# Patient Record
Sex: FEMALE | Race: WHITE | NOT HISPANIC OR LATINO | Employment: OTHER | URBAN - METROPOLITAN AREA
[De-identification: names, ages, dates, MRNs, and addresses within clinical notes are randomized per-mention and may not be internally consistent; named-entity substitution may affect disease eponyms.]

---

## 2023-11-16 ENCOUNTER — OFFICE VISIT (OUTPATIENT)
Dept: DERMATOLOGY | Facility: CLINIC | Age: 83
End: 2023-11-16
Payer: COMMERCIAL

## 2023-11-16 DIAGNOSIS — Z12.83 SKIN CANCER SCREENING: Primary | ICD-10-CM

## 2023-11-16 DIAGNOSIS — L30.4 INTERTRIGO: ICD-10-CM

## 2023-11-16 DIAGNOSIS — D22.9 NEVUS: ICD-10-CM

## 2023-11-16 DIAGNOSIS — L24.9 IRRITANT CONTACT DERMATITIS, UNSPECIFIED TRIGGER: ICD-10-CM

## 2023-11-16 DIAGNOSIS — L81.4 LENTIGO: ICD-10-CM

## 2023-11-16 DIAGNOSIS — A49.8 PSEUDOMONAS AERUGINOSA INFECTION: ICD-10-CM

## 2023-11-16 DIAGNOSIS — L82.1 SEBORRHEIC KERATOSIS: ICD-10-CM

## 2023-11-16 PROCEDURE — 1125F AMNT PAIN NOTED PAIN PRSNT: CPT | Performed by: NURSE PRACTITIONER

## 2023-11-16 PROCEDURE — 99214 OFFICE O/P EST MOD 30 MIN: CPT | Performed by: NURSE PRACTITIONER

## 2023-11-16 PROCEDURE — 1159F MED LIST DOCD IN RCRD: CPT | Performed by: NURSE PRACTITIONER

## 2023-11-16 RX ORDER — TRIAMCINOLONE ACETONIDE 1 MG/G
CREAM TOPICAL 2 TIMES DAILY PRN
Qty: 28.4 G | Refills: 0 | Status: SHIPPED | OUTPATIENT
Start: 2023-11-16 | End: 2024-01-03 | Stop reason: ALTCHOICE

## 2023-11-16 RX ORDER — CIPROFLOXACIN HYDROCHLORIDE 3 MG/ML
1 SOLUTION/ DROPS OPHTHALMIC 2 TIMES DAILY
Qty: 5 ML | Refills: 1 | Status: SHIPPED | OUTPATIENT
Start: 2023-11-16

## 2023-11-16 RX ORDER — KETOCONAZOLE 20 MG/G
CREAM TOPICAL 2 TIMES DAILY
Qty: 30 G | Refills: 1 | Status: SHIPPED | OUTPATIENT
Start: 2023-11-16 | End: 2024-01-03 | Stop reason: ALTCHOICE

## 2023-11-16 RX ORDER — CIPROFLOXACIN HYDROCHLORIDE 3 MG/ML
1 SOLUTION/ DROPS OPHTHALMIC 2 TIMES DAILY
COMMUNITY
Start: 2022-11-10 | End: 2023-11-16 | Stop reason: SDUPTHER

## 2023-11-16 NOTE — PROGRESS NOTES
Subjective     Heike Muñiz is a 82 y.o. female who presents for the following: Skin Check.     Patient of Ani nolasco last seen for annual skin check and pseudomonal nail infection treated with ciprofloxacin 0.3% drops twice daily to left thumb November 10, 2022 today patient is in for her annual skin exam.      Review of Systems:  No other skin or systemic complaints other than what is documented elsewhere in the note.    The following portions of the chart were reviewed this encounter and updated as appropriate:       Skin Cancer History  No skin cancer on file.    Specialty Problems    None    Past Medical History:  Heike Muñiz  has a past medical history of Personal history of other diseases of the musculoskeletal system and connective tissue (09/03/2015) and Personal history of other endocrine, nutritional and metabolic disease (09/03/2015).    Past Surgical History:  Heike Muñiz  has a past surgical history that includes Colonoscopy (09/03/2015); Tonsillectomy (09/03/2015); and Other surgical history (10/05/2015).    Family History:  Patient family history is not on file.    Social History:  Heike Muñiz  has no history on file for tobacco use, alcohol use, and drug use.    Allergies:  Patient has no known allergies.    Current Medications / CAM's:    Current Outpatient Medications:     ciprofloxacin (Ciloxan) 0.3 % ophthalmic solution, Administer 1 drop into the right eye 2 times a day. Apply to affected left finger nail, Disp: 5 mL, Rfl: 1    ketoconazole (NIZOral) 2 % cream, Apply topically 2 times a day. Under breasts, Disp: 30 g, Rfl: 1    triamcinolone (Kenalog) 0.1 % cream, Apply topically 2 times a day as needed for rash. To left breast from adhesive, Disp: 28.4 g, Rfl: 0     Objective   Well appearing patient in no apparent distress; mood and affect are within normal limits.    A full examination was performed including scalp, head, eyes, ears, nose, lips, neck, chest, axillae, abdomen, back, buttocks,  bilateral upper extremities, bilateral lower extremities, hands, feet, fingers, toes, fingernails, and toenails. All findings within normal limits unless otherwise noted below.    Assessment/Plan   1. Skin cancer screening  82 year old recently diagnosed with lung cancer in the right upper lung presents for FBSE.  She has not seen oncology yet, so she is unsure of the course of treatment at this time.    The patient presented for a routine skin examination today. There are no specific concerns regarding skin health and no new or changing moles, lesions, or rashes.     Assessment: Based on the comprehensive skin examination, there were no concerning or abnormal findings. The patient's skin appeared to be in good health, without any notable dermatologic conditions or lesions.    Plan: Given the absence of any significant skin findings, no specific interventions or treatments are warranted at this time. The patient was educated on the importance of regular skin self-examinations and advised to promptly report any changes or concerns. Routine follow-up for a skin examination was recommended.    -These lesions have benign, reassuring patterns on dermoscopy.  -There were no concerning features found on exam today.  -Recommend continued self-observation, and to contact the office if any changes in nevi are  noticed.    Discussed/information given on safe sun practices and use of sunscreen, sun protective clothing or sun avoidance. Recommend to use OTC medication of sunscreen SPF 30 or higher on a daily basis prior to sun exposure to reduce the risk of skin cancer.    Contact Office if: Any lesions change in size, shape or color; itch, bum or bleed.         2. Intertrigo  Left Inframammary Fold; Right Inframammary Fold    Intertrigo is any infectious or noninfectious inflammatory condition of two closely opposed skin surfaces (intertriginous area). The warm, moist environment of the skin folds is ideal for maintaining this  condition. Constant rubbing (friction) can also worsen the condition.      Risk factors for this condition include: Obesity, clothing (especially tight-fitting clothing) that chafes skin, activities that promote skin-on-skin rubbing, sweating, incontinence, occupational exposure to moisture, such as wearing rubber gloves    Skin care tips:       After antifungal treatment, initiate skin care with drying agents to reduce risk of recurrence. Use of over-the-counter drying agents such as Zeasorb, help reduce moisture.        If incontinent, a structured skin care routine and the use of absorbent hygiene products (adult briefs, underwear liners) can help reduce skin breakdown and reduce the risk of intertrigo.       Wear properly fitting clothes.     PLAN:  Start ketoconazole 2% cream twice daily until clear    Related Medications  ketoconazole (NIZOral) 2 % cream  Apply topically 2 times a day. Under breasts    3. Irritant contact dermatitis, unspecified trigger  Left Breast  Patient recently had a biopsy 3 days ago for diagnosis of lung cancer with a contact allergen related to adhesive from the bandage at the biopsy site.  Patient states it is itchy and was previously blistering.  There is no bandage on exam today patient encouraged to discontinue use.  Biopsy site appears to be healing well without evidence of infection    Can use triamcinolone 0.1% cream twice daily for up to 2 weeks or until itching and redness resolved    triamcinolone (Kenalog) 0.1 % cream - Left Breast  Apply topically 2 times a day as needed for rash. To left breast from adhesive    4. Pseudomonas aeruginosa infection  Left Thumb Nail Plate  Green to black discoloration to left thumbnail in a patient that regularly gets manicures.  Patient had a similar problem last year that was resolved with ciprofloxacin eyedrops applied for 1 to 2 months.  Patient states that she noticed it returned several weeks ago    We discussed the relationship with  "Pseudomonas of the nail and manicures  Patient states she understands  Ciprofloxacin 0.3% ophthalmic solution prescribed but patient is aware she should not apply to her eyes but apply twice daily to affected nail until clear  She was also encouraged to do white vinegar soaks for 10 minutes 3 times a day.  Patient admits she likely won't be able to given her new lung cancer diagnosis    Related Medications  ciprofloxacin (Ciloxan) 0.3 % ophthalmic solution  Administer 1 drop into the right eye 2 times a day. Apply to affected left finger nail    5. Nevus  Multiple benign appearing flesh colored to pigmented macules and papules     Plan: Counseling.  I counseled the patient regarding the following:  Instructions: Monthly self-skin checks to monitor for any changes in moles are recommended. Expectations: Benign Nevi are pigmented nests of cells within the skin.No treatment is necessary. Contact Office if: Any moles change in size, shape or color; itch, bum or bleed.    6. Lentigo  Benign pigmented macules appearing in sun exposed areas     Solar lentigo (a type of lentigo also known as a senile lentigo, age spot, or liver spot) is a benign pigmented macule appearing on fair-skinned individuals that is related to ultraviolet radiation (UVR) exposure, typically from the sun.     PLAN:  Limiting sun exposure through avoidance, protective clothing, and use of sunscreens can help prevent the appearance of solar lentigines.    If lesion changes or becomes symptomatic she should return to clinic    7. Seborrheic keratosis    Seborrheic keratoses (SKs) are extremely common benign neoplasms of the skin. There can be few or hundreds of these raised, \"stuck-on\"-appearing papules and plaques with well-defined borders. The cause is unknown, although there is a familial trait for the development of multiple SKs.      SKs tend to increase in incidence and number with increasing age.     Skin Care: Seborrheic Keratoses are benign. No " treatment is necessary.    Patient was instructed to call the office if any lesions become irritated or inflamed

## 2023-12-04 NOTE — PROGRESS NOTES
Subjective   Heike Muñiz  is a 83 y.o. female who presents for evaluation of left lung cancer.      This started with a blood clot, which led to a scan and an incidental nodule. She then received a CT scan of the chest on 10/5/2023 which showed a 1.9 x 2.3 cm mass in the left upper lobe.  This included a solid component measuring 8 mm in diameter.  The solid component measuring 5 mm on the previous CT scan.  She was referred for a CT-guided lung biopsy on 10/31/2023, which showed lung cancer.  She also underwent a PET/CT on 11/13/2023, which showed no evidence of metastatic disease.    Currently the patient is in their usual state of health. They deny the following symptoms: chest pain, shortness of breath at rest, shortness of breath with activity, cough, hemoptysis, fevers, chills, weight loss, abdominal pain, changes to bowel function, and changes to mental status.      There have been no significant changes to their documented medical, surgical and family history.   Medical history is notable for no history of MI, CVA or other major cardiovascular disease. He has no history of prior chest surgery.  She had a meningioma on her brain left side s/p surgery ~2019    She  reports that she has quit smoking. Her smoking use included cigarettes. She has never used smokeless tobacco. She reports current alcohol use. She reports that she does not use drugs.  She quit 20 year ago after smoking 1ppd x 40 years    Objective   Physical Exam  The patient is well-appearing and in no acute distress. The trachea is midline and there is no crepitus. The lungs were clear to auscultation grossly. There was good effort and excursion. The heart had a regular rate and rhythm. The abdomen was soft, nontender and nondistended. The extremities had no edema or gross deformities. Mood and affect are appropriate.  Diagnostic Studies  I have reviewed a CT which shows a 9mm solid nodule with a 2.9cm ground glass  I have reviewed a PET which  shows no metastasis  I have reviewed a biopsy which shows TTF1 positive lung cancer    Assessment/Plan   Overall, I believe that the patient  has a new diagnosis of clinical stage 1 lung cancer .     She appears to be a reasonable surgical candidate. We will get PFTs to check breathing capacity.  Given the small peripheral nature of the tumor, Ithink that a wedge resection is reasonable, if the wedge resection frozen section is positive, she may require a lobectomy. I described the risks, benefits and alteratives to this with the patient in detail. She expressed understanding and agreed to proceed.     I recommend  PFTS and surgery. Left VATS/RATS wedge, poss lobe at [place]. Patient prefers surgery in Jan 2024 .     I discussed this in detail with the patient, including a discussion of alternatives. They were comfortable with this approach.     Enrique Sorenson MD  [phone]

## 2023-12-06 ENCOUNTER — OFFICE VISIT (OUTPATIENT)
Dept: SURGERY | Facility: CLINIC | Age: 83
End: 2023-12-06
Payer: COMMERCIAL

## 2023-12-06 VITALS
SYSTOLIC BLOOD PRESSURE: 143 MMHG | WEIGHT: 174.6 LBS | RESPIRATION RATE: 20 BRPM | HEIGHT: 63 IN | BODY MASS INDEX: 30.94 KG/M2 | HEART RATE: 71 BPM | OXYGEN SATURATION: 96 % | DIASTOLIC BLOOD PRESSURE: 69 MMHG | TEMPERATURE: 98.2 F

## 2023-12-06 DIAGNOSIS — C34.12 PRIMARY CANCER OF LEFT UPPER LOBE OF LUNG (MULTI): Primary | ICD-10-CM

## 2023-12-06 PROCEDURE — 1159F MED LIST DOCD IN RCRD: CPT | Performed by: THORACIC SURGERY (CARDIOTHORACIC VASCULAR SURGERY)

## 2023-12-06 PROCEDURE — 99215 OFFICE O/P EST HI 40 MIN: CPT | Mod: 57 | Performed by: THORACIC SURGERY (CARDIOTHORACIC VASCULAR SURGERY)

## 2023-12-06 PROCEDURE — 1036F TOBACCO NON-USER: CPT | Performed by: THORACIC SURGERY (CARDIOTHORACIC VASCULAR SURGERY)

## 2023-12-06 PROCEDURE — 99205 OFFICE O/P NEW HI 60 MIN: CPT | Performed by: THORACIC SURGERY (CARDIOTHORACIC VASCULAR SURGERY)

## 2023-12-06 PROCEDURE — 1126F AMNT PAIN NOTED NONE PRSNT: CPT | Performed by: THORACIC SURGERY (CARDIOTHORACIC VASCULAR SURGERY)

## 2023-12-06 RX ORDER — LORATADINE 10 MG/1
10 TABLET ORAL DAILY PRN
COMMUNITY
Start: 2015-09-25 | End: 2024-01-03 | Stop reason: ALTCHOICE

## 2023-12-06 RX ORDER — UBIDECARENONE 30 MG
1 CAPSULE ORAL DAILY
COMMUNITY
Start: 2020-02-13

## 2023-12-06 RX ORDER — LANOLIN ALCOHOL/MO/W.PET/CERES
1000 CREAM (GRAM) TOPICAL DAILY
COMMUNITY
Start: 2023-11-13

## 2023-12-06 RX ORDER — FLUTICASONE PROPIONATE 50 MCG
1 SPRAY, SUSPENSION (ML) NASAL DAILY PRN
COMMUNITY
Start: 2017-06-27

## 2023-12-06 RX ORDER — GABAPENTIN 300 MG/1
300 CAPSULE ORAL 2 TIMES DAILY
COMMUNITY

## 2023-12-06 RX ORDER — GABAPENTIN 100 MG/1
300 CAPSULE ORAL ONCE
Status: CANCELLED | OUTPATIENT
Start: 2023-12-06 | End: 2023-12-06

## 2023-12-06 RX ORDER — HEPARIN SODIUM 5000 [USP'U]/ML
5000 INJECTION, SOLUTION INTRAVENOUS; SUBCUTANEOUS ONCE
Status: CANCELLED | OUTPATIENT
Start: 2023-12-06 | End: 2023-12-06

## 2023-12-06 RX ORDER — TALC
6 POWDER (GRAM) TOPICAL NIGHTLY
COMMUNITY
Start: 2023-10-17

## 2023-12-06 RX ORDER — ACETAMINOPHEN 500 MG
125 TABLET ORAL DAILY
COMMUNITY
Start: 2023-10-17

## 2023-12-06 RX ORDER — PANTOPRAZOLE SODIUM 40 MG/1
40 TABLET, DELAYED RELEASE ORAL 2 TIMES DAILY
COMMUNITY
Start: 2023-11-13

## 2023-12-06 RX ORDER — SIMVASTATIN 40 MG/1
40 TABLET, FILM COATED ORAL NIGHTLY
COMMUNITY
Start: 2023-11-13

## 2023-12-06 RX ORDER — APIXABAN 2.5 MG/1
2.5 TABLET, FILM COATED ORAL 2 TIMES DAILY
COMMUNITY

## 2023-12-06 SDOH — ECONOMIC STABILITY: FOOD INSECURITY: WITHIN THE PAST 12 MONTHS, THE FOOD YOU BOUGHT JUST DIDN'T LAST AND YOU DIDN'T HAVE MONEY TO GET MORE.: NEVER TRUE

## 2023-12-06 SDOH — ECONOMIC STABILITY: FOOD INSECURITY: WITHIN THE PAST 12 MONTHS, YOU WORRIED THAT YOUR FOOD WOULD RUN OUT BEFORE YOU GOT MONEY TO BUY MORE.: NEVER TRUE

## 2023-12-06 ASSESSMENT — COLUMBIA-SUICIDE SEVERITY RATING SCALE - C-SSRS
6. HAVE YOU EVER DONE ANYTHING, STARTED TO DO ANYTHING, OR PREPARED TO DO ANYTHING TO END YOUR LIFE?: NO
1. IN THE PAST MONTH, HAVE YOU WISHED YOU WERE DEAD OR WISHED YOU COULD GO TO SLEEP AND NOT WAKE UP?: NO
2. HAVE YOU ACTUALLY HAD ANY THOUGHTS OF KILLING YOURSELF?: NO

## 2023-12-06 ASSESSMENT — ENCOUNTER SYMPTOMS
OCCASIONAL FEELINGS OF UNSTEADINESS: 0
LOSS OF SENSATION IN FEET: 0
DEPRESSION: 0

## 2023-12-06 ASSESSMENT — PATIENT HEALTH QUESTIONNAIRE - PHQ9
2. FEELING DOWN, DEPRESSED OR HOPELESS: NOT AT ALL
SUM OF ALL RESPONSES TO PHQ9 QUESTIONS 1 AND 2: 0
1. LITTLE INTEREST OR PLEASURE IN DOING THINGS: NOT AT ALL

## 2023-12-06 ASSESSMENT — PAIN SCALES - GENERAL: PAINLEVEL: 0-NO PAIN

## 2023-12-07 DIAGNOSIS — R91.1 INCIDENTAL LUNG NODULE, GREATER THAN OR EQUAL TO 8MM: Primary | ICD-10-CM

## 2023-12-08 PROBLEM — C34.12 PRIMARY CANCER OF LEFT UPPER LOBE OF LUNG (MULTI): Status: ACTIVE | Noted: 2023-12-06

## 2023-12-21 ENCOUNTER — HOSPITAL ENCOUNTER (OUTPATIENT)
Dept: RESPIRATORY THERAPY | Facility: HOSPITAL | Age: 83
Discharge: HOME | End: 2023-12-21
Payer: COMMERCIAL

## 2023-12-21 DIAGNOSIS — R91.1 INCIDENTAL LUNG NODULE, GREATER THAN OR EQUAL TO 8MM: ICD-10-CM

## 2023-12-21 PROCEDURE — 94060 EVALUATION OF WHEEZING: CPT

## 2023-12-24 LAB
MGC ASCENT PFT - FEV1 - POST: 1.16
MGC ASCENT PFT - FEV1 - PRE: 1.13
MGC ASCENT PFT - FEV1 - PREDICTED: 1.85
MGC ASCENT PFT - FVC - POST: 2.28
MGC ASCENT PFT - FVC - PRE: 2.23
MGC ASCENT PFT - FVC - PREDICTED: 2.43

## 2024-01-03 ENCOUNTER — PRE-ADMISSION TESTING (OUTPATIENT)
Dept: PREADMISSION TESTING | Facility: HOSPITAL | Age: 84
End: 2024-01-03
Payer: COMMERCIAL

## 2024-01-03 VITALS
HEIGHT: 63 IN | WEIGHT: 216.05 LBS | HEART RATE: 67 BPM | BODY MASS INDEX: 38.28 KG/M2 | DIASTOLIC BLOOD PRESSURE: 63 MMHG | TEMPERATURE: 96.8 F | OXYGEN SATURATION: 96 % | RESPIRATION RATE: 18 BRPM | SYSTOLIC BLOOD PRESSURE: 153 MMHG

## 2024-01-03 DIAGNOSIS — Z01.818 PRE-OP TESTING: ICD-10-CM

## 2024-01-03 DIAGNOSIS — C34.12 PRIMARY CANCER OF LEFT UPPER LOBE OF LUNG (MULTI): ICD-10-CM

## 2024-01-03 LAB
ABO GROUP (TYPE) IN BLOOD: NORMAL
ABO GROUP (TYPE) IN BLOOD: NORMAL
ANION GAP SERPL CALC-SCNC: 10 MMOL/L (ref 10–20)
ANTIBODY SCREEN: NORMAL
BUN SERPL-MCNC: 13 MG/DL (ref 6–23)
CALCIUM SERPL-MCNC: 9.7 MG/DL (ref 8.6–10.3)
CHLORIDE SERPL-SCNC: 106 MMOL/L (ref 98–107)
CO2 SERPL-SCNC: 28 MMOL/L (ref 21–32)
CREAT SERPL-MCNC: 0.56 MG/DL (ref 0.5–1.05)
ERYTHROCYTE [DISTWIDTH] IN BLOOD BY AUTOMATED COUNT: 13 % (ref 11.5–14.5)
GFR SERPL CREATININE-BSD FRML MDRD: >90 ML/MIN/1.73M*2
GLUCOSE SERPL-MCNC: 87 MG/DL (ref 74–99)
HCT VFR BLD AUTO: 43.5 % (ref 36–46)
HGB BLD-MCNC: 13.8 G/DL (ref 12–16)
MCH RBC QN AUTO: 28.6 PG (ref 26–34)
MCHC RBC AUTO-ENTMCNC: 31.7 G/DL (ref 32–36)
MCV RBC AUTO: 90 FL (ref 80–100)
NRBC BLD-RTO: 0 /100 WBCS (ref 0–0)
PLATELET # BLD AUTO: 242 X10*3/UL (ref 150–450)
POTASSIUM SERPL-SCNC: 4.4 MMOL/L (ref 3.5–5.3)
RBC # BLD AUTO: 4.83 X10*6/UL (ref 4–5.2)
RH FACTOR (ANTIGEN D): NORMAL
RH FACTOR (ANTIGEN D): NORMAL
SODIUM SERPL-SCNC: 140 MMOL/L (ref 136–145)
WBC # BLD AUTO: 6.1 X10*3/UL (ref 4.4–11.3)

## 2024-01-03 PROCEDURE — 80048 BASIC METABOLIC PNL TOTAL CA: CPT

## 2024-01-03 PROCEDURE — 93005 ELECTROCARDIOGRAM TRACING: CPT

## 2024-01-03 PROCEDURE — 85027 COMPLETE CBC AUTOMATED: CPT

## 2024-01-03 PROCEDURE — 93010 ELECTROCARDIOGRAM REPORT: CPT | Performed by: INTERNAL MEDICINE

## 2024-01-03 PROCEDURE — 99203 OFFICE O/P NEW LOW 30 MIN: CPT | Performed by: NURSE PRACTITIONER

## 2024-01-03 PROCEDURE — 86850 RBC ANTIBODY SCREEN: CPT

## 2024-01-03 PROCEDURE — 36415 COLL VENOUS BLD VENIPUNCTURE: CPT

## 2024-01-03 PROCEDURE — 86920 COMPATIBILITY TEST SPIN: CPT

## 2024-01-03 ASSESSMENT — DUKE ACTIVITY SCORE INDEX (DASI)
CAN YOU DO MODERATE WORK AROUND THE HOUSE LIKE VACUUMING, SWEEPING FLOORS OR CARRYING GROCERIES: YES
CAN YOU DO YARD WORK LIKE RAKING LEAVES, WEEDING OR PUSHING A MOWER: NO
CAN YOU PARTICIPATE IN STRENOUS SPORTS LIKE SWIMMING, SINGLES TENNIS, FOOTBALL, BASKETBALL, OR SKIING: NO
CAN YOU DO HEAVY WORK AROUND THE HOUSE LIKE SCRUBBING FLOORS OR LIFTING AND MOVING HEAVY FURNITURE: NO
CAN YOU WALK INDOORS, SUCH AS AROUND YOUR HOUSE: YES
CAN YOU PARTICIPATE IN MODERATE RECREATIONAL ACTIVITIES LIKE GOLF, BOWLING, DANCING, DOUBLES TENNIS OR THROWING A BASEBALL OR FOOTBALL: NO
CAN YOU RUN A SHORT DISTANCE: NO
CAN YOU WALK A BLOCK OR TWO ON LEVEL GROUND: YES
CAN YOU CLIMB A FLIGHT OF STAIRS OR WALK UP A HILL: NO
CAN YOU HAVE SEXUAL RELATIONS: NO
CAN YOU DO LIGHT WORK AROUND THE HOUSE LIKE DUSTING OR WASHING DISHES: YES

## 2024-01-03 NOTE — CPM/PAT H&P
"CPM/PAT Evaluation       Name: Heike Muñiz (Heike Muñiz)  /Age: 1940/83 y.o.     In-Person       Chief Complaint:     83  yr old female with c/o incidentally find a lung nodule on left side.  States years ago she had a blood clot in her \"stomach\" found after c/o weakness and fever --->treated with Eliquis.    She states they found a nodule to her left lung that \"they have been watching.\"   She states it has been monitored with PET scans and found that area around the nodule and the nodule is bigger.  She followed up with a biopsy 10/31/2023 and demonstrating lung cancer.    She c/o LOPEZ d/t inactive lifestyle.  Denies SOB  Denies cough, denies hemoptysis  Quit smoking 20 years ago  Denies CP     Heme Dr. Polanco-manages Eliquis  PCP Dr. Guerrier    Denies any cardiac or respiratory symptoms.  Denies any past issues with anesthesia.    LEFT UPPER LOBE WEDGE VIDEO ASSIST THORACOSCOPY is Scheduled on 2024 with Dr. Sorenson    Vitals:    24 0946   BP: 153/63   Pulse: 67   Resp: 18   Temp: 36 °C (96.8 °F)   SpO2: 96%          Past Medical History:   Diagnosis Date    Arthritis     Brain tumor (CMS/HCC)     Broken bones     Cataract     Diverticulosis     DVT (deep venous thrombosis) (CMS/HCC)     GERD (gastroesophageal reflux disease)     Heart disease     Heartburn     HL (hearing loss)     Hypercholesteremia     Meningitis     Personal history of other diseases of the musculoskeletal system and connective tissue 2015    History of osteoporosis    Personal history of other endocrine, nutritional and metabolic disease 2015    History of hypoglycemia    Portal vein thrombosis     Rheumatoid arthritis (CMS/HCC)     Sinusitis     Vision loss        Past Surgical History:   Procedure Laterality Date    ADENOIDECTOMY      BRAIN SURGERY      10/2019    CARDIAC CATHETERIZATION      CARPAL TUNNEL RELEASE      CHOLECYSTECTOMY      COLON SURGERY          COLONOSCOPY  2015    Complete Colonoscopy "    OTHER SURGICAL HISTORY  10/05/2015    Excision Of Soft Tissue Tumor Of The Back    TONSILLECTOMY  09/03/2015    Tonsillectomy    UPPER GASTROINTESTINAL ENDOSCOPY         Patient  has no history on file for sexual activity.    Family History   Problem Relation Name Age of Onset    Anemia Mother      Heart attack Mother      Cancer Sister      Other (htn) Sister      Hyperlipidemia Sister         Allergies   Allergen Reactions    Moxifloxacin Hives, Nausea/vomiting, Shortness of breath and Unknown     Fluoroquinolones    Azithromycin Rash and Unknown    Acetaminophen Other    Adhes. Band-Tape-Benzalkonium Itching    Amoxicillin Hives and Unknown    Antacid Ii Hives     Analgesics - OpioidASA - Arthritus Strength/Antacid    Aspirin Hives    Codeine-Cpm-Pe-Acetaminophen Hives and Itching    Egg GI Upset     Egg Yolks    Egg Yolk Nausea/vomiting and Dizziness    Hydrocodone Hives and Psychosis    Hydrocodone-Acetaminophen Hives and Itching     Tylenol 3    Metronidazole Unknown    Mylanta-Ii Unknown    Miconazole Rash       Prior to Admission medications    Medication Sig Start Date End Date Taking? Authorizing Provider   calcium carbonate/vitamin D3 (CALCIUM 600 + D,3, ORAL) Take 2 tablets by mouth once daily. 5/4/22   Historical Provider, MD   cholecalciferol (Vitamin D3) 5,000 Units tablet Take 1 tablet (125 mcg) by mouth once daily. 10/17/23   Historical Provider, MD   ciprofloxacin (Ciloxan) 0.3 % ophthalmic solution Administer 1 drop into the right eye 2 times a day. Apply to affected left finger nail 11/16/23   Ani Kim APRN-CNP   cyanocobalamin (Vitamin B-12) 1,000 mcg tablet Take 1 tablet (1,000 mcg) by mouth once daily. 11/13/23   Historical Provider, MD   Eliquis 2.5 mg tablet Take 1 tablet (2.5 mg) by mouth 2 times a day.    Historical Provider, MD   fluticasone (Flonase Allergy Relief) 50 mcg/actuation nasal spray Administer 1 spray into each nostril once daily as needed. 6/27/17   Historical  Provider, MD   gabapentin (Neurontin) 300 mg capsule Take 1 capsule (300 mg) by mouth 2 times a day.    Historical Provider, MD   ketoconazole (NIZOral) 2 % cream Apply topically 2 times a day. Under breasts 11/16/23   ORI Rodriguez   loratadine (Claritin) 10 mg tablet Take 1 tablet (10 mg) by mouth once daily as needed. 9/25/15   Historical Provider, MD   melatonin 3 mg tablet Take 2 tablets (6 mg) by mouth once daily at bedtime. 10/17/23   Historical Provider, MD   mvKarenmin-folic acid-lutein (Essential Woman 50 Plus) 0.4-250 mg-mcg tablet Take 1 tablet by mouth once daily. 2/13/20   Historical Provider, MD   pantoprazole (ProtoNix) 40 mg EC tablet Take 1 tablet (40 mg) by mouth 2 times a day. 11/13/23   Historical Provider, MD   simvastatin (Zocor) 40 mg tablet Take 1 tablet (40 mg) by mouth once daily at bedtime. 11/13/23   Historical Provider, MD   triamcinolone (Kenalog) 0.1 % cream Apply topically 2 times a day as needed for rash. To left breast from adhesive 11/16/23   ORI Rodriguez   TURMERIC ORAL Take 1 tablet by mouth once daily. 10/17/23   Historical Provider, MD        Review of Systems  Constitutional: NO F, chills, or sweats  Eyes: no blurred vision or visual disturbance, glasses  ENT: denies congestion, sore throat, + hearing aids  Cardiovascular: no chest pain, no edema, no palps and no syncope.   Respiratory: see HPI, no cough,no resting s.o.b. and no wheezing  Gastrointestinal: no abdominal pain, no N/V, no blood in stools  Genitourinary: no dysuria, no urinary frequency, no urinary hesitancy and no feelings of urinary urgency.   Musculoskeletal: no arthralgias,  no back pain and no myalgias.   Integumentary: no new skin lesions and no rashes.   Neurological: no difficulty walking, no headache, no limb weakness, no numbness and no tingling.   Endocrine: no recent weight gain and no recent weight loss.   Hematologic/Lymphatic: + tendency for easy bruising and no swollen  glands.      Physical Exam  Constitutional:       Appearance: Normal appearance.   Cardiovascular:      Rate and Rhythm: Normal rate.      Heart sounds: Murmur heard.      Systolic murmur is present with a grade of 2/6.   Pulmonary:      Effort: Pulmonary effort is normal.      Breath sounds: Examination of the right-lower field reveals rhonchi. Rhonchi present.   Abdominal:      General: Bowel sounds are normal.      Palpations: Abdomen is soft.   Musculoskeletal:         General: Normal range of motion.      Cervical back: Normal range of motion.      Right lower leg: No edema.      Left lower leg: No edema.   Skin:     General: Skin is warm and dry.   Neurological:      Mental Status: She is alert and oriented to person, place, and time.          PAT AIRWAY:   Airway:     Mallampati::  IV    TM distance::  <3 FB    Neck ROM::  Full      Visit Vitals  /63   Pulse 67   Temp 36 °C (96.8 °F) (Temporal)   Resp 18       DASI Risk Score    No data to display       Caprini DVT Assessment    No data to display       Modified Frailty Index    No data to display       CHADS2 Stroke Risk         N/A 3 - 100%: High Risk   2 - 3%: Medium Risk   0 - 2%: Low Risk     Last Change: N/A          This score determines the patient's risk of having a stroke if the patient has atrial fibrillation.        This score is not applicable to this patient. Components are not calculated.          Revised Cardiac Risk Index    No data to display       Apfel Simplified Score    No data to display       Risk Analysis Index Results This Encounter    No data found in the last 1 encounters.       Stop Bang Score      Flowsheet Row Most Recent Value   Do you snore loudly? 0   Do you often feel tired or fatigued after your sleep? 0   Has anyone ever observed you stop breathing in your sleep? 0   Do you have or are you being treated for high blood pressure? 0   Recent BMI (Calculated) 38.3   Is BMI greater than 35 kg/m2? 1=Yes   Age older than 50  years old? 1=Yes   Gender - Male 0=No

## 2024-01-03 NOTE — PREPROCEDURE INSTRUCTIONS
Medication List            Accurate as of January 3, 2024 10:03 AM. Always use your most recent med list.                CALCIUM 600 + D(3) ORAL  Medication Adjustments for Surgery: Stop 7 days before surgery     ciprofloxacin 0.3 % ophthalmic solution  Commonly known as: Ciloxan  Administer 1 drop into the right eye 2 times a day. Apply to affected left finger nail  Medication Adjustments for Surgery: Other (Comment)     Eliquis 2.5 mg tablet  Generic drug: apixaban  Notes to patient: Stop 1/5      Essential Woman 50 Plus 0.4-250 mg-mcg tablet  Generic drug: mv-min-folic acid-lutein  Medication Adjustments for Surgery: Stop 7 days before surgery     Flonase Allergy Relief 50 mcg/actuation nasal spray  Generic drug: fluticasone  Medication Adjustments for Surgery: Other (Comment)     gabapentin 300 mg capsule  Commonly known as: Neurontin  Medication Adjustments for Surgery: Take morning of surgery with sip of water, no other fluids     melatonin 3 mg tablet  Medication Adjustments for Surgery: Stop 7 days before surgery     pantoprazole 40 mg EC tablet  Commonly known as: ProtoNix  Medication Adjustments for Surgery: Take morning of surgery with sip of water, no other fluids     simvastatin 40 mg tablet  Commonly known as: Zocor  Medication Adjustments for Surgery: Continue until night before surgery     TURMERIC ORAL  Medication Adjustments for Surgery: Stop 7 days before surgery     Vitamin B-12 1,000 mcg tablet  Generic drug: cyanocobalamin  Medication Adjustments for Surgery: Stop 7 days before surgery     Vitamin D3 5,000 Units tablet  Generic drug: cholecalciferol  Medication Adjustments for Surgery: Stop 7 days before surgery              PRE-OPERATIVE INSTRUCTIONS FOR SURGERY    *Do not eat anything after midnight the night of surgery.  This includes food of any kind (including hard candy, cough drops, mints and gum) and beverages (including coffee and soda).  You may drink up to one 8 ounce glass of  water up until three hours before your scheduled surgery time.    *One of our staff members will call you ONE business day before your surgery, between 11a-2p  To let you know the time to arrive.  If you have no received a call by 2 pm, call [phone]  *When you arrive at the hospital-->GO TO Registration on the ground floor  *Stop smoking 24 hours prior to surgery.  No Marijuana, CBD Oil or Vaping for 48 hours  *No alcohol 24 hours prior to surgery  *You will need a responsible adult to drive you home  -No acrylic nails or nail polish on at least one fingernail, NO polish on toes for foot surgery  -You may be asked to remove your dentures, partial plate, eyeglasses or contact lenses before going to surgery.  Please bring a case for these items.  -Body piercings need to be removed.  Jewelry and valuables should be left at home.  -Put on loose,  comfortable, clean clothing, that will accommodate bandages    HOME PREOPERATIVE ANTIBACTERIAL SHOWER:  1/8 pm  1/9 am  -This shower is a way of cleaning the skin with germ killing solution before surgery.  The solution contains Chorhexidine (CHG).   Let your Dr. Know if you are allergic to Chlorhexidine.    NIGHT BEFORE SURGERY:  IF you are having a TOTAL joint replacement- YOU WILL SHOWER 2 NIGHTS PRIOR to surgery    -Take a normal shower and wash your hair  -Turn OFF water to avoid rinsing the antibacterial skin cleanser off (CHG).  -Apply the CHG cleanser to a clean and wet washcloth.  Lather your entire body from the neck down.    -DO NOT USE ON THE HEAD, FACE, or GENITALS.  -RINSE immediately if CHG is in contact with your eyes, ears or mouth  -Gently wash your body.  Have the CHG cleanser stay on your skin for 3 MINUTES.  -After 3 minutes, turn on water and rinse the CHG cleanser off your body completely  -DO NOT WASH with regular soap after using CHG.  -PAT DRY with a clean fresh towel  -DO NOT apply any alvarenga, deodorants or lotions  -Dress in clean night  cloths  **CHG cleanser will cause stains to fabrics if you wash them with bleach after use.     DAY OF SURGERY:  -Take shower-->JUST GET WET.  Turn OFF water.  -REPEAT the CHG cleanse as you did the night before.  -PAT DRY-->    What you may be asked to bring to surgery:  ___Crutches, walker  ___CPAP machine  ___Urine specimen

## 2024-01-09 ENCOUNTER — ANESTHESIA EVENT (OUTPATIENT)
Dept: OPERATING ROOM | Facility: HOSPITAL | Age: 84
DRG: 168 | End: 2024-01-09
Payer: MEDICARE

## 2024-01-09 ENCOUNTER — HOSPITAL ENCOUNTER (INPATIENT)
Facility: HOSPITAL | Age: 84
LOS: 2 days | Discharge: HOME | DRG: 168 | End: 2024-01-11
Attending: THORACIC SURGERY (CARDIOTHORACIC VASCULAR SURGERY) | Admitting: THORACIC SURGERY (CARDIOTHORACIC VASCULAR SURGERY)
Payer: MEDICARE

## 2024-01-09 ENCOUNTER — ANESTHESIA (OUTPATIENT)
Dept: OPERATING ROOM | Facility: HOSPITAL | Age: 84
DRG: 168 | End: 2024-01-09
Payer: MEDICARE

## 2024-01-09 ENCOUNTER — APPOINTMENT (OUTPATIENT)
Dept: RADIOLOGY | Facility: HOSPITAL | Age: 84
DRG: 168 | End: 2024-01-09
Payer: MEDICARE

## 2024-01-09 DIAGNOSIS — C34.12 PRIMARY CANCER OF LEFT UPPER LOBE OF LUNG (MULTI): Primary | ICD-10-CM

## 2024-01-09 DIAGNOSIS — Z01.818 PRE-OP TESTING: ICD-10-CM

## 2024-01-09 LAB
ANION GAP SERPL CALC-SCNC: 13 MMOL/L (ref 10–20)
BUN SERPL-MCNC: 17 MG/DL (ref 6–23)
CALCIUM SERPL-MCNC: 9.2 MG/DL (ref 8.6–10.3)
CHLORIDE SERPL-SCNC: 108 MMOL/L (ref 98–107)
CO2 SERPL-SCNC: 22 MMOL/L (ref 21–32)
CREAT SERPL-MCNC: 0.69 MG/DL (ref 0.5–1.05)
EGFRCR SERPLBLD CKD-EPI 2021: 87 ML/MIN/1.73M*2
ERYTHROCYTE [DISTWIDTH] IN BLOOD BY AUTOMATED COUNT: 12.5 % (ref 11.5–14.5)
GLUCOSE SERPL-MCNC: 163 MG/DL (ref 74–99)
HCT VFR BLD AUTO: 49.5 % (ref 36–46)
HGB BLD-MCNC: 15.2 G/DL (ref 12–16)
MAGNESIUM SERPL-MCNC: 2.05 MG/DL (ref 1.6–2.4)
MCH RBC QN AUTO: 28.8 PG (ref 26–34)
MCHC RBC AUTO-ENTMCNC: 30.7 G/DL (ref 32–36)
MCV RBC AUTO: 94 FL (ref 80–100)
NRBC BLD-RTO: 0 /100 WBCS (ref 0–0)
PLATELET # BLD AUTO: 202 X10*3/UL (ref 150–450)
POTASSIUM SERPL-SCNC: 4.4 MMOL/L (ref 3.5–5.3)
RBC # BLD AUTO: 5.27 X10*6/UL (ref 4–5.2)
SODIUM SERPL-SCNC: 139 MMOL/L (ref 136–145)
WBC # BLD AUTO: 15.4 X10*3/UL (ref 4.4–11.3)

## 2024-01-09 PROCEDURE — 2500000004 HC RX 250 GENERAL PHARMACY W/ HCPCS (ALT 636 FOR OP/ED): Performed by: THORACIC SURGERY (CARDIOTHORACIC VASCULAR SURGERY)

## 2024-01-09 PROCEDURE — 32663 THORACOSCOPY W/LOBECTOMY: CPT | Performed by: THORACIC SURGERY (CARDIOTHORACIC VASCULAR SURGERY)

## 2024-01-09 PROCEDURE — 88381 MICRODISSECTION MANUAL: CPT | Performed by: STUDENT IN AN ORGANIZED HEALTH CARE EDUCATION/TRAINING PROGRAM

## 2024-01-09 PROCEDURE — 2020000001 HC ICU ROOM DAILY

## 2024-01-09 PROCEDURE — 0BBG4ZX EXCISION OF LEFT UPPER LUNG LOBE, PERCUTANEOUS ENDOSCOPIC APPROACH, DIAGNOSTIC: ICD-10-PCS | Performed by: THORACIC SURGERY (CARDIOTHORACIC VASCULAR SURGERY)

## 2024-01-09 PROCEDURE — 80048 BASIC METABOLIC PNL TOTAL CA: CPT | Performed by: NURSE PRACTITIONER

## 2024-01-09 PROCEDURE — 99100 ANES PT EXTEME AGE<1 YR&>70: CPT | Performed by: ANESTHESIOLOGY

## 2024-01-09 PROCEDURE — 88342 IMHCHEM/IMCYTCHM 1ST ANTB: CPT | Performed by: STUDENT IN AN ORGANIZED HEALTH CARE EDUCATION/TRAINING PROGRAM

## 2024-01-09 PROCEDURE — 83735 ASSAY OF MAGNESIUM: CPT | Performed by: NURSE PRACTITIONER

## 2024-01-09 PROCEDURE — 2500000002 HC RX 250 W HCPCS SELF ADMINISTERED DRUGS (ALT 637 FOR MEDICARE OP, ALT 636 FOR OP/ED): Performed by: NURSE PRACTITIONER

## 2024-01-09 PROCEDURE — 88309 TISSUE EXAM BY PATHOLOGIST: CPT | Performed by: STUDENT IN AN ORGANIZED HEALTH CARE EDUCATION/TRAINING PROGRAM

## 2024-01-09 PROCEDURE — 2500000004 HC RX 250 GENERAL PHARMACY W/ HCPCS (ALT 636 FOR OP/ED): Performed by: ANESTHESIOLOGY

## 2024-01-09 PROCEDURE — A32666 PR THORACOSCOPY W/THERA WEDGE RESEXN INITIAL UNILAT: Performed by: ANESTHESIOLOGY

## 2024-01-09 PROCEDURE — 32668 THORACOSCOPY W/W RESECT DIAG: CPT | Performed by: THORACIC SURGERY (CARDIOTHORACIC VASCULAR SURGERY)

## 2024-01-09 PROCEDURE — 07B74ZX EXCISION OF THORAX LYMPHATIC, PERCUTANEOUS ENDOSCOPIC APPROACH, DIAGNOSTIC: ICD-10-PCS | Performed by: THORACIC SURGERY (CARDIOTHORACIC VASCULAR SURGERY)

## 2024-01-09 PROCEDURE — 88341 IMHCHEM/IMCYTCHM EA ADD ANTB: CPT | Performed by: STUDENT IN AN ORGANIZED HEALTH CARE EDUCATION/TRAINING PROGRAM

## 2024-01-09 PROCEDURE — 88313 SPECIAL STAINS GROUP 2: CPT | Performed by: STUDENT IN AN ORGANIZED HEALTH CARE EDUCATION/TRAINING PROGRAM

## 2024-01-09 PROCEDURE — 94760 N-INVAS EAR/PLS OXIMETRY 1: CPT

## 2024-01-09 PROCEDURE — 88332 PATH CONSLTJ SURG EA ADD BLK: CPT | Performed by: STUDENT IN AN ORGANIZED HEALTH CARE EDUCATION/TRAINING PROGRAM

## 2024-01-09 PROCEDURE — 88307 TISSUE EXAM BY PATHOLOGIST: CPT | Performed by: STUDENT IN AN ORGANIZED HEALTH CARE EDUCATION/TRAINING PROGRAM

## 2024-01-09 PROCEDURE — 2500000004 HC RX 250 GENERAL PHARMACY W/ HCPCS (ALT 636 FOR OP/ED): Performed by: ANESTHESIOLOGIST ASSISTANT

## 2024-01-09 PROCEDURE — 88305 TISSUE EXAM BY PATHOLOGIST: CPT | Performed by: STUDENT IN AN ORGANIZED HEALTH CARE EDUCATION/TRAINING PROGRAM

## 2024-01-09 PROCEDURE — 2500000005 HC RX 250 GENERAL PHARMACY W/O HCPCS: Performed by: THORACIC SURGERY (CARDIOTHORACIC VASCULAR SURGERY)

## 2024-01-09 PROCEDURE — 85027 COMPLETE CBC AUTOMATED: CPT | Performed by: NURSE PRACTITIONER

## 2024-01-09 PROCEDURE — 3700000001 HC GENERAL ANESTHESIA TIME - INITIAL BASE CHARGE: Performed by: THORACIC SURGERY (CARDIOTHORACIC VASCULAR SURGERY)

## 2024-01-09 PROCEDURE — 88307 TISSUE EXAM BY PATHOLOGIST: CPT | Mod: TC,SUR,PARLAB | Performed by: THORACIC SURGERY (CARDIOTHORACIC VASCULAR SURGERY)

## 2024-01-09 PROCEDURE — G0452 MOLECULAR PATHOLOGY INTERPR: HCPCS | Performed by: THORACIC SURGERY (CARDIOTHORACIC VASCULAR SURGERY)

## 2024-01-09 PROCEDURE — 2500000004 HC RX 250 GENERAL PHARMACY W/ HCPCS (ALT 636 FOR OP/ED): Performed by: NURSE PRACTITIONER

## 2024-01-09 PROCEDURE — 3600000004 HC OR TIME - INITIAL BASE CHARGE - PROCEDURE LEVEL FOUR: Performed by: THORACIC SURGERY (CARDIOTHORACIC VASCULAR SURGERY)

## 2024-01-09 PROCEDURE — 32674 THORACOSCOPY LYMPH NODE EXC: CPT | Performed by: THORACIC SURGERY (CARDIOTHORACIC VASCULAR SURGERY)

## 2024-01-09 PROCEDURE — 3700000002 HC GENERAL ANESTHESIA TIME - EACH INCREMENTAL 1 MINUTE: Performed by: THORACIC SURGERY (CARDIOTHORACIC VASCULAR SURGERY)

## 2024-01-09 PROCEDURE — 2500000001 HC RX 250 WO HCPCS SELF ADMINISTERED DRUGS (ALT 637 FOR MEDICARE OP): Performed by: NURSE PRACTITIONER

## 2024-01-09 PROCEDURE — C1751 CATH, INF, PER/CENT/MIDLINE: HCPCS | Performed by: THORACIC SURGERY (CARDIOTHORACIC VASCULAR SURGERY)

## 2024-01-09 PROCEDURE — 7100000001 HC RECOVERY ROOM TIME - INITIAL BASE CHARGE: Performed by: THORACIC SURGERY (CARDIOTHORACIC VASCULAR SURGERY)

## 2024-01-09 PROCEDURE — 99223 1ST HOSP IP/OBS HIGH 75: CPT | Performed by: NURSE PRACTITIONER

## 2024-01-09 PROCEDURE — A4217 STERILE WATER/SALINE, 500 ML: HCPCS | Performed by: THORACIC SURGERY (CARDIOTHORACIC VASCULAR SURGERY)

## 2024-01-09 PROCEDURE — 94640 AIRWAY INHALATION TREATMENT: CPT

## 2024-01-09 PROCEDURE — 71045 X-RAY EXAM CHEST 1 VIEW: CPT

## 2024-01-09 PROCEDURE — 88360 TUMOR IMMUNOHISTOCHEM/MANUAL: CPT | Performed by: STUDENT IN AN ORGANIZED HEALTH CARE EDUCATION/TRAINING PROGRAM

## 2024-01-09 PROCEDURE — 71045 X-RAY EXAM CHEST 1 VIEW: CPT | Performed by: RADIOLOGY

## 2024-01-09 PROCEDURE — 2780000003 HC OR 278 NO HCPCS: Performed by: THORACIC SURGERY (CARDIOTHORACIC VASCULAR SURGERY)

## 2024-01-09 PROCEDURE — 88331 PATH CONSLTJ SURG 1 BLK 1SPC: CPT | Performed by: STUDENT IN AN ORGANIZED HEALTH CARE EDUCATION/TRAINING PROGRAM

## 2024-01-09 PROCEDURE — 36415 COLL VENOUS BLD VENIPUNCTURE: CPT | Performed by: NURSE PRACTITIONER

## 2024-01-09 PROCEDURE — 81445 SO NEO GSAP 5-50DNA/DNA&RNA: CPT | Performed by: THORACIC SURGERY (CARDIOTHORACIC VASCULAR SURGERY)

## 2024-01-09 PROCEDURE — A32666 PR THORACOSCOPY W/THERA WEDGE RESEXN INITIAL UNILAT: Performed by: ANESTHESIOLOGIST ASSISTANT

## 2024-01-09 PROCEDURE — 7100000002 HC RECOVERY ROOM TIME - EACH INCREMENTAL 1 MINUTE: Performed by: THORACIC SURGERY (CARDIOTHORACIC VASCULAR SURGERY)

## 2024-01-09 PROCEDURE — 3600000009 HC OR TIME - EACH INCREMENTAL 1 MINUTE - PROCEDURE LEVEL FOUR: Performed by: THORACIC SURGERY (CARDIOTHORACIC VASCULAR SURGERY)

## 2024-01-09 PROCEDURE — 2720000007 HC OR 272 NO HCPCS: Performed by: THORACIC SURGERY (CARDIOTHORACIC VASCULAR SURGERY)

## 2024-01-09 PROCEDURE — 2500000005 HC RX 250 GENERAL PHARMACY W/O HCPCS: Performed by: ANESTHESIOLOGIST ASSISTANT

## 2024-01-09 PROCEDURE — 88312 SPECIAL STAINS GROUP 1: CPT | Performed by: STUDENT IN AN ORGANIZED HEALTH CARE EDUCATION/TRAINING PROGRAM

## 2024-01-09 RX ORDER — FLUTICASONE PROPIONATE 50 MCG
1 SPRAY, SUSPENSION (ML) NASAL DAILY
Status: DISCONTINUED | OUTPATIENT
Start: 2024-01-09 | End: 2024-01-11 | Stop reason: HOSPADM

## 2024-01-09 RX ORDER — PROPOFOL 10 MG/ML
INJECTION, EMULSION INTRAVENOUS AS NEEDED
Status: DISCONTINUED | OUTPATIENT
Start: 2024-01-09 | End: 2024-01-09

## 2024-01-09 RX ORDER — ROCURONIUM BROMIDE 10 MG/ML
INJECTION, SOLUTION INTRAVENOUS AS NEEDED
Status: DISCONTINUED | OUTPATIENT
Start: 2024-01-09 | End: 2024-01-09

## 2024-01-09 RX ORDER — OXYCODONE HYDROCHLORIDE 5 MG/1
5 TABLET ORAL EVERY 6 HOURS PRN
Status: DISCONTINUED | OUTPATIENT
Start: 2024-01-09 | End: 2024-01-11 | Stop reason: HOSPADM

## 2024-01-09 RX ORDER — SODIUM CHLORIDE, SODIUM LACTATE, POTASSIUM CHLORIDE, CALCIUM CHLORIDE 600; 310; 30; 20 MG/100ML; MG/100ML; MG/100ML; MG/100ML
100 INJECTION, SOLUTION INTRAVENOUS CONTINUOUS
Status: DISCONTINUED | OUTPATIENT
Start: 2024-01-09 | End: 2024-01-09 | Stop reason: HOSPADM

## 2024-01-09 RX ORDER — MEPERIDINE HYDROCHLORIDE 25 MG/ML
INJECTION INTRAMUSCULAR; INTRAVENOUS; SUBCUTANEOUS AS NEEDED
Status: DISCONTINUED | OUTPATIENT
Start: 2024-01-09 | End: 2024-01-09

## 2024-01-09 RX ORDER — LIDOCAINE HCL/PF 100 MG/5ML
SYRINGE (ML) INTRAVENOUS AS NEEDED
Status: DISCONTINUED | OUTPATIENT
Start: 2024-01-09 | End: 2024-01-09

## 2024-01-09 RX ORDER — DEXTROSE 50 % IN WATER (D50W) INTRAVENOUS SYRINGE
25
Status: DISCONTINUED | OUTPATIENT
Start: 2024-01-09 | End: 2024-01-11 | Stop reason: HOSPADM

## 2024-01-09 RX ORDER — HYDRALAZINE HYDROCHLORIDE 20 MG/ML
5 INJECTION INTRAMUSCULAR; INTRAVENOUS EVERY 30 MIN PRN
Status: DISCONTINUED | OUTPATIENT
Start: 2024-01-09 | End: 2024-01-09 | Stop reason: HOSPADM

## 2024-01-09 RX ORDER — FAMOTIDINE 10 MG/ML
20 INJECTION INTRAVENOUS ONCE
Status: DISCONTINUED | OUTPATIENT
Start: 2024-01-09 | End: 2024-01-09 | Stop reason: HOSPADM

## 2024-01-09 RX ORDER — ONDANSETRON HYDROCHLORIDE 2 MG/ML
4 INJECTION, SOLUTION INTRAVENOUS ONCE AS NEEDED
Status: DISCONTINUED | OUTPATIENT
Start: 2024-01-09 | End: 2024-01-09 | Stop reason: HOSPADM

## 2024-01-09 RX ORDER — MORPHINE SULFATE 2 MG/ML
2 INJECTION, SOLUTION INTRAMUSCULAR; INTRAVENOUS EVERY 5 MIN PRN
Status: DISCONTINUED | OUTPATIENT
Start: 2024-01-09 | End: 2024-01-09 | Stop reason: HOSPADM

## 2024-01-09 RX ORDER — DIPHENHYDRAMINE HYDROCHLORIDE 50 MG/ML
25 INJECTION INTRAMUSCULAR; INTRAVENOUS ONCE AS NEEDED
Status: DISCONTINUED | OUTPATIENT
Start: 2024-01-09 | End: 2024-01-09 | Stop reason: HOSPADM

## 2024-01-09 RX ORDER — PANTOPRAZOLE SODIUM 40 MG/1
40 TABLET, DELAYED RELEASE ORAL 2 TIMES DAILY
Status: DISCONTINUED | OUTPATIENT
Start: 2024-01-09 | End: 2024-01-11 | Stop reason: HOSPADM

## 2024-01-09 RX ORDER — MIDAZOLAM HYDROCHLORIDE 1 MG/ML
1 INJECTION, SOLUTION INTRAMUSCULAR; INTRAVENOUS ONCE AS NEEDED
Status: DISCONTINUED | OUTPATIENT
Start: 2024-01-09 | End: 2024-01-09 | Stop reason: HOSPADM

## 2024-01-09 RX ORDER — LANOLIN ALCOHOL/MO/W.PET/CERES
1000 CREAM (GRAM) TOPICAL DAILY
Status: DISCONTINUED | OUTPATIENT
Start: 2024-01-09 | End: 2024-01-11 | Stop reason: HOSPADM

## 2024-01-09 RX ORDER — NORETHINDRONE AND ETHINYL ESTRADIOL 0.5-0.035
KIT ORAL AS NEEDED
Status: DISCONTINUED | OUTPATIENT
Start: 2024-01-09 | End: 2024-01-09

## 2024-01-09 RX ORDER — GABAPENTIN 300 MG/1
300 CAPSULE ORAL ONCE
Status: DISCONTINUED | OUTPATIENT
Start: 2024-01-09 | End: 2024-01-09

## 2024-01-09 RX ORDER — OXYCODONE HYDROCHLORIDE 5 MG/1
10 TABLET ORAL EVERY 6 HOURS PRN
Status: DISCONTINUED | OUTPATIENT
Start: 2024-01-09 | End: 2024-01-11 | Stop reason: HOSPADM

## 2024-01-09 RX ORDER — IPRATROPIUM BROMIDE AND ALBUTEROL SULFATE 2.5; .5 MG/3ML; MG/3ML
3 SOLUTION RESPIRATORY (INHALATION)
Status: DISCONTINUED | OUTPATIENT
Start: 2024-01-09 | End: 2024-01-09

## 2024-01-09 RX ORDER — FERROUS SULFATE, DRIED 160(50) MG
2 TABLET, EXTENDED RELEASE ORAL DAILY
Status: DISCONTINUED | OUTPATIENT
Start: 2024-01-09 | End: 2024-01-11 | Stop reason: HOSPADM

## 2024-01-09 RX ORDER — GABAPENTIN 300 MG/1
300 CAPSULE ORAL 2 TIMES DAILY
Status: DISCONTINUED | OUTPATIENT
Start: 2024-01-09 | End: 2024-01-11 | Stop reason: HOSPADM

## 2024-01-09 RX ORDER — FENTANYL CITRATE 50 UG/ML
25 INJECTION, SOLUTION INTRAMUSCULAR; INTRAVENOUS
Status: DISCONTINUED | OUTPATIENT
Start: 2024-01-09 | End: 2024-01-11 | Stop reason: HOSPADM

## 2024-01-09 RX ORDER — AMOXICILLIN 250 MG
2 CAPSULE ORAL 2 TIMES DAILY
Status: DISCONTINUED | OUTPATIENT
Start: 2024-01-09 | End: 2024-01-11 | Stop reason: HOSPADM

## 2024-01-09 RX ORDER — ALBUTEROL SULFATE 0.83 MG/ML
2.5 SOLUTION RESPIRATORY (INHALATION) ONCE AS NEEDED
Status: DISCONTINUED | OUTPATIENT
Start: 2024-01-09 | End: 2024-01-09 | Stop reason: HOSPADM

## 2024-01-09 RX ORDER — ENOXAPARIN SODIUM 100 MG/ML
40 INJECTION SUBCUTANEOUS DAILY
Status: DISCONTINUED | OUTPATIENT
Start: 2024-01-10 | End: 2024-01-11 | Stop reason: HOSPADM

## 2024-01-09 RX ORDER — WATER 1 ML/ML
IRRIGANT IRRIGATION AS NEEDED
Status: DISCONTINUED | OUTPATIENT
Start: 2024-01-09 | End: 2024-01-09 | Stop reason: HOSPADM

## 2024-01-09 RX ORDER — METOPROLOL TARTRATE 25 MG/1
12.5 TABLET, FILM COATED ORAL 2 TIMES DAILY
Status: DISCONTINUED | OUTPATIENT
Start: 2024-01-09 | End: 2024-01-11 | Stop reason: HOSPADM

## 2024-01-09 RX ORDER — HYDROMORPHONE HYDROCHLORIDE 1 MG/ML
1 INJECTION, SOLUTION INTRAMUSCULAR; INTRAVENOUS; SUBCUTANEOUS EVERY 5 MIN PRN
Status: DISCONTINUED | OUTPATIENT
Start: 2024-01-09 | End: 2024-01-09 | Stop reason: HOSPADM

## 2024-01-09 RX ORDER — METOPROLOL TARTRATE 1 MG/ML
5 INJECTION, SOLUTION INTRAVENOUS ONCE
Status: DISCONTINUED | OUTPATIENT
Start: 2024-01-09 | End: 2024-01-09 | Stop reason: HOSPADM

## 2024-01-09 RX ORDER — IPRATROPIUM BROMIDE AND ALBUTEROL SULFATE 2.5; .5 MG/3ML; MG/3ML
3 SOLUTION RESPIRATORY (INHALATION) EVERY 2 HOUR PRN
Status: DISCONTINUED | OUTPATIENT
Start: 2024-01-09 | End: 2024-01-11 | Stop reason: HOSPADM

## 2024-01-09 RX ORDER — LANOLIN ALCOHOL/MO/W.PET/CERES
400 CREAM (GRAM) TOPICAL DAILY
Status: DISCONTINUED | OUTPATIENT
Start: 2024-01-09 | End: 2024-01-11 | Stop reason: HOSPADM

## 2024-01-09 RX ORDER — SCOLOPAMINE TRANSDERMAL SYSTEM 1 MG/1
1 PATCH, EXTENDED RELEASE TRANSDERMAL ONCE
Status: DISCONTINUED | OUTPATIENT
Start: 2024-01-09 | End: 2024-01-09 | Stop reason: HOSPADM

## 2024-01-09 RX ORDER — SODIUM CHLORIDE 0.9 G/100ML
IRRIGANT IRRIGATION AS NEEDED
Status: DISCONTINUED | OUTPATIENT
Start: 2024-01-09 | End: 2024-01-09 | Stop reason: HOSPADM

## 2024-01-09 RX ORDER — PANTOPRAZOLE SODIUM 40 MG/10ML
40 INJECTION, POWDER, LYOPHILIZED, FOR SOLUTION INTRAVENOUS
Status: DISCONTINUED | OUTPATIENT
Start: 2024-01-10 | End: 2024-01-09

## 2024-01-09 RX ORDER — CHOLECALCIFEROL (VITAMIN D3) 25 MCG
5000 TABLET ORAL DAILY
Status: DISCONTINUED | OUTPATIENT
Start: 2024-01-09 | End: 2024-01-11 | Stop reason: HOSPADM

## 2024-01-09 RX ORDER — HEPARIN SODIUM 5000 [USP'U]/ML
5000 INJECTION, SOLUTION INTRAVENOUS; SUBCUTANEOUS ONCE
Status: COMPLETED | OUTPATIENT
Start: 2024-01-09 | End: 2024-01-09

## 2024-01-09 RX ORDER — SIMVASTATIN 20 MG/1
40 TABLET, FILM COATED ORAL NIGHTLY
Status: DISCONTINUED | OUTPATIENT
Start: 2024-01-09 | End: 2024-01-11 | Stop reason: HOSPADM

## 2024-01-09 RX ORDER — MEPERIDINE HYDROCHLORIDE 25 MG/ML
12.5 INJECTION INTRAMUSCULAR; INTRAVENOUS; SUBCUTANEOUS EVERY 10 MIN PRN
Status: DISCONTINUED | OUTPATIENT
Start: 2024-01-09 | End: 2024-01-09 | Stop reason: HOSPADM

## 2024-01-09 RX ORDER — BUPIVACAINE HYDROCHLORIDE 2.5 MG/ML
INJECTION, SOLUTION INFILTRATION; PERINEURAL AS NEEDED
Status: DISCONTINUED | OUTPATIENT
Start: 2024-01-09 | End: 2024-01-09 | Stop reason: HOSPADM

## 2024-01-09 RX ORDER — LIDOCAINE HYDROCHLORIDE 40 MG/ML
INJECTION, SOLUTION RETROBULBAR AS NEEDED
Status: DISCONTINUED | OUTPATIENT
Start: 2024-01-09 | End: 2024-01-09

## 2024-01-09 RX ORDER — CIPROFLOXACIN HYDROCHLORIDE 3 MG/ML
1 SOLUTION/ DROPS OPHTHALMIC 2 TIMES DAILY
Status: DISCONTINUED | OUTPATIENT
Start: 2024-01-09 | End: 2024-01-11 | Stop reason: HOSPADM

## 2024-01-09 RX ORDER — LABETALOL HYDROCHLORIDE 5 MG/ML
5 INJECTION, SOLUTION INTRAVENOUS ONCE AS NEEDED
Status: DISCONTINUED | OUTPATIENT
Start: 2024-01-09 | End: 2024-01-09 | Stop reason: HOSPADM

## 2024-01-09 RX ORDER — FENTANYL CITRATE 50 UG/ML
INJECTION, SOLUTION INTRAMUSCULAR; INTRAVENOUS AS NEEDED
Status: DISCONTINUED | OUTPATIENT
Start: 2024-01-09 | End: 2024-01-09

## 2024-01-09 RX ORDER — ACETAMINOPHEN 325 MG/1
975 TABLET ORAL ONCE
Status: DISCONTINUED | OUTPATIENT
Start: 2024-01-09 | End: 2024-01-09 | Stop reason: HOSPADM

## 2024-01-09 RX ORDER — PHENYLEPHRINE HCL IN 0.9% NACL 1 MG/10 ML
SYRINGE (ML) INTRAVENOUS AS NEEDED
Status: DISCONTINUED | OUTPATIENT
Start: 2024-01-09 | End: 2024-01-09

## 2024-01-09 RX ORDER — DEXTROSE MONOHYDRATE 100 MG/ML
0.3 INJECTION, SOLUTION INTRAVENOUS ONCE AS NEEDED
Status: DISCONTINUED | OUTPATIENT
Start: 2024-01-09 | End: 2024-01-11 | Stop reason: HOSPADM

## 2024-01-09 RX ORDER — IPRATROPIUM BROMIDE AND ALBUTEROL SULFATE 2.5; .5 MG/3ML; MG/3ML
3 SOLUTION RESPIRATORY (INHALATION)
Status: DISCONTINUED | OUTPATIENT
Start: 2024-01-09 | End: 2024-01-11 | Stop reason: HOSPADM

## 2024-01-09 RX ORDER — ONDANSETRON HYDROCHLORIDE 2 MG/ML
INJECTION, SOLUTION INTRAVENOUS AS NEEDED
Status: DISCONTINUED | OUTPATIENT
Start: 2024-01-09 | End: 2024-01-09

## 2024-01-09 RX ORDER — DEXAMETHASONE SODIUM PHOSPHATE 4 MG/ML
INJECTION, SOLUTION INTRA-ARTICULAR; INTRALESIONAL; INTRAMUSCULAR; INTRAVENOUS; SOFT TISSUE AS NEEDED
Status: DISCONTINUED | OUTPATIENT
Start: 2024-01-09 | End: 2024-01-09

## 2024-01-09 RX ORDER — TALC
6 POWDER (GRAM) TOPICAL NIGHTLY
Status: DISCONTINUED | OUTPATIENT
Start: 2024-01-09 | End: 2024-01-11 | Stop reason: HOSPADM

## 2024-01-09 RX ORDER — CEFAZOLIN SODIUM 2 G/100ML
2 INJECTION, SOLUTION INTRAVENOUS ONCE
Status: COMPLETED | OUTPATIENT
Start: 2024-01-09 | End: 2024-01-09

## 2024-01-09 RX ADMIN — ROCURONIUM BROMIDE 30 MG: 10 INJECTION, SOLUTION INTRAVENOUS at 09:33

## 2024-01-09 RX ADMIN — PANTOPRAZOLE SODIUM 40 MG: 40 TABLET, DELAYED RELEASE ORAL at 14:45

## 2024-01-09 RX ADMIN — SODIUM CHLORIDE, POTASSIUM CHLORIDE, SODIUM LACTATE AND CALCIUM CHLORIDE 100 ML/HR: 600; 310; 30; 20 INJECTION, SOLUTION INTRAVENOUS at 10:50

## 2024-01-09 RX ADMIN — GABAPENTIN 300 MG: 300 CAPSULE ORAL at 14:45

## 2024-01-09 RX ADMIN — MAGNESIUM OXIDE TAB 400 MG (241.3 MG ELEMENTAL MG) 400 MG: 400 (241.3 MG) TAB at 14:46

## 2024-01-09 RX ADMIN — GABAPENTIN 300 MG: 300 CAPSULE ORAL at 21:28

## 2024-01-09 RX ADMIN — FENTANYL CITRATE 50 MCG: 50 INJECTION, SOLUTION INTRAMUSCULAR; INTRAVENOUS at 08:41

## 2024-01-09 RX ADMIN — ONDANSETRON 4 MG: 2 INJECTION INTRAMUSCULAR; INTRAVENOUS at 07:49

## 2024-01-09 RX ADMIN — OXYCODONE HYDROCHLORIDE 10 MG: 5 TABLET ORAL at 14:47

## 2024-01-09 RX ADMIN — SUGAMMADEX 200 MG: 100 INJECTION, SOLUTION INTRAVENOUS at 10:45

## 2024-01-09 RX ADMIN — PROPOFOL 150 MG: 10 INJECTION, EMULSION INTRAVENOUS at 07:53

## 2024-01-09 RX ADMIN — EPHEDRINE SULFATE 10 MG: 50 INJECTION, SOLUTION INTRAVENOUS at 10:22

## 2024-01-09 RX ADMIN — PANTOPRAZOLE SODIUM 40 MG: 40 TABLET, DELAYED RELEASE ORAL at 21:28

## 2024-01-09 RX ADMIN — EPHEDRINE SULFATE 10 MG: 50 INJECTION, SOLUTION INTRAVENOUS at 09:00

## 2024-01-09 RX ADMIN — PROPOFOL 50 MG: 10 INJECTION, EMULSION INTRAVENOUS at 09:26

## 2024-01-09 RX ADMIN — SENNOSIDES AND DOCUSATE SODIUM 2 TABLET: 8.6; 5 TABLET ORAL at 21:28

## 2024-01-09 RX ADMIN — SIMVASTATIN 40 MG: 20 TABLET, FILM COATED ORAL at 21:29

## 2024-01-09 RX ADMIN — HEPARIN SODIUM 5000 UNITS: 5000 INJECTION INTRAVENOUS; SUBCUTANEOUS at 06:41

## 2024-01-09 RX ADMIN — SENNOSIDES AND DOCUSATE SODIUM 2 TABLET: 8.6; 5 TABLET ORAL at 14:44

## 2024-01-09 RX ADMIN — METOPROLOL TARTRATE 12.5 MG: 25 TABLET, FILM COATED ORAL at 14:46

## 2024-01-09 RX ADMIN — EPHEDRINE SULFATE 10 MG: 50 INJECTION, SOLUTION INTRAVENOUS at 08:46

## 2024-01-09 RX ADMIN — FENTANYL CITRATE 50 MCG: 50 INJECTION, SOLUTION INTRAMUSCULAR; INTRAVENOUS at 07:53

## 2024-01-09 RX ADMIN — EPHEDRINE SULFATE 10 MG: 50 INJECTION, SOLUTION INTRAVENOUS at 08:24

## 2024-01-09 RX ADMIN — HYDROMORPHONE HYDROCHLORIDE 1 MG: 1 INJECTION, SOLUTION INTRAMUSCULAR; INTRAVENOUS; SUBCUTANEOUS at 11:18

## 2024-01-09 RX ADMIN — OXYCODONE HYDROCHLORIDE 5 MG: 5 TABLET ORAL at 21:27

## 2024-01-09 RX ADMIN — LIDOCAINE HYDROCHLORIDE 3 ML: 40 INJECTION, SOLUTION RETROBULBAR; TOPICAL at 07:53

## 2024-01-09 RX ADMIN — CEFAZOLIN SODIUM 2 G: 2 INJECTION, SOLUTION INTRAVENOUS at 07:59

## 2024-01-09 RX ADMIN — IPRATROPIUM BROMIDE AND ALBUTEROL SULFATE 3 ML: .5; 3 SOLUTION RESPIRATORY (INHALATION) at 18:58

## 2024-01-09 RX ADMIN — Medication 6 MG: at 21:27

## 2024-01-09 RX ADMIN — MEPERIDINE HYDROCHLORIDE 25 MG: 25 INJECTION INTRAMUSCULAR; INTRAVENOUS; SUBCUTANEOUS at 10:38

## 2024-01-09 RX ADMIN — Medication 100 MCG: at 07:59

## 2024-01-09 RX ADMIN — CHOLECALCIFEROL TAB 25 MCG (1000 UNIT) 5000 UNITS: 25 TAB at 14:46

## 2024-01-09 RX ADMIN — CIPROFLOXACIN HYDROCHLORIDE 1 DROP: 3 SOLUTION/ DROPS OPHTHALMIC at 18:47

## 2024-01-09 RX ADMIN — ROCURONIUM BROMIDE 50 MG: 10 INJECTION, SOLUTION INTRAVENOUS at 07:53

## 2024-01-09 RX ADMIN — EPHEDRINE SULFATE 10 MG: 50 INJECTION, SOLUTION INTRAVENOUS at 09:41

## 2024-01-09 RX ADMIN — SODIUM CHLORIDE, SODIUM LACTATE, POTASSIUM CHLORIDE, AND CALCIUM CHLORIDE: .6; .31; .03; .02 INJECTION, SOLUTION INTRAVENOUS at 07:40

## 2024-01-09 RX ADMIN — DEXAMETHASONE SODIUM PHOSPHATE 4 MG: 4 INJECTION, SOLUTION INTRAMUSCULAR; INTRAVENOUS at 08:04

## 2024-01-09 RX ADMIN — CYANOCOBALAMIN TAB 1000 MCG 1000 MCG: 1000 TAB at 14:46

## 2024-01-09 RX ADMIN — LIDOCAINE HYDROCHLORIDE 100 MG: 20 INJECTION INTRAVENOUS at 07:53

## 2024-01-09 RX ADMIN — METOPROLOL TARTRATE 12.5 MG: 25 TABLET, FILM COATED ORAL at 21:29

## 2024-01-09 SDOH — HEALTH STABILITY: MENTAL HEALTH: CURRENT SMOKER: 0

## 2024-01-09 SDOH — SOCIAL STABILITY: SOCIAL INSECURITY: ARE THERE ANY APPARENT SIGNS OF INJURIES/BEHAVIORS THAT COULD BE RELATED TO ABUSE/NEGLECT?: NO

## 2024-01-09 SDOH — SOCIAL STABILITY: SOCIAL INSECURITY: ABUSE: ADULT

## 2024-01-09 SDOH — SOCIAL STABILITY: SOCIAL INSECURITY: ARE YOU OR HAVE YOU BEEN THREATENED OR ABUSED PHYSICALLY, EMOTIONALLY, OR SEXUALLY BY ANYONE?: NO

## 2024-01-09 SDOH — SOCIAL STABILITY: SOCIAL INSECURITY: HAS ANYONE EVER THREATENED TO HURT YOUR FAMILY OR YOUR PETS?: NO

## 2024-01-09 SDOH — SOCIAL STABILITY: SOCIAL INSECURITY: WERE YOU ABLE TO COMPLETE ALL THE BEHAVIORAL HEALTH SCREENINGS?: YES

## 2024-01-09 SDOH — SOCIAL STABILITY: SOCIAL INSECURITY: DO YOU FEEL UNSAFE GOING BACK TO THE PLACE WHERE YOU ARE LIVING?: NO

## 2024-01-09 SDOH — SOCIAL STABILITY: SOCIAL INSECURITY: DOES ANYONE TRY TO KEEP YOU FROM HAVING/CONTACTING OTHER FRIENDS OR DOING THINGS OUTSIDE YOUR HOME?: NO

## 2024-01-09 ASSESSMENT — COGNITIVE AND FUNCTIONAL STATUS - GENERAL
TURNING FROM BACK TO SIDE WHILE IN FLAT BAD: A LOT
DRESSING REGULAR LOWER BODY CLOTHING: A LOT
WALKING IN HOSPITAL ROOM: A LOT
MOBILITY SCORE: 12
EATING MEALS: A LITTLE
MOVING TO AND FROM BED TO CHAIR: A LOT
TOILETING: A LOT
DRESSING REGULAR UPPER BODY CLOTHING: A LOT
HELP NEEDED FOR BATHING: A LOT
PATIENT BASELINE BEDBOUND: NO
CLIMB 3 TO 5 STEPS WITH RAILING: A LOT
DAILY ACTIVITIY SCORE: 14
MOVING FROM LYING ON BACK TO SITTING ON SIDE OF FLAT BED WITH BEDRAILS: A LOT
STANDING UP FROM CHAIR USING ARMS: A LOT
PERSONAL GROOMING: A LITTLE

## 2024-01-09 ASSESSMENT — PAIN - FUNCTIONAL ASSESSMENT
PAIN_FUNCTIONAL_ASSESSMENT: 0-10

## 2024-01-09 ASSESSMENT — ACTIVITIES OF DAILY LIVING (ADL)
FEEDING YOURSELF: INDEPENDENT
HEARING - LEFT EAR: FUNCTIONAL
GROOMING: NEEDS ASSISTANCE
BATHING: NEEDS ASSISTANCE
HEARING - RIGHT EAR: FUNCTIONAL
JUDGMENT_ADEQUATE_SAFELY_COMPLETE_DAILY_ACTIVITIES: YES
DRESSING YOURSELF: NEEDS ASSISTANCE
ADEQUATE_TO_COMPLETE_ADL: YES
PATIENT'S MEMORY ADEQUATE TO SAFELY COMPLETE DAILY ACTIVITIES?: YES
WALKS IN HOME: INDEPENDENT
TOILETING: NEEDS ASSISTANCE

## 2024-01-09 ASSESSMENT — COLUMBIA-SUICIDE SEVERITY RATING SCALE - C-SSRS
2. HAVE YOU ACTUALLY HAD ANY THOUGHTS OF KILLING YOURSELF?: NO
1. IN THE PAST MONTH, HAVE YOU WISHED YOU WERE DEAD OR WISHED YOU COULD GO TO SLEEP AND NOT WAKE UP?: NO
6. HAVE YOU EVER DONE ANYTHING, STARTED TO DO ANYTHING, OR PREPARED TO DO ANYTHING TO END YOUR LIFE?: NO

## 2024-01-09 ASSESSMENT — PAIN SCALES - GENERAL
PAINLEVEL_OUTOF10: 0 - NO PAIN
PAINLEVEL_OUTOF10: 7
PAINLEVEL_OUTOF10: 6
PAINLEVEL_OUTOF10: 0 - NO PAIN
PAINLEVEL_OUTOF10: 7
PAINLEVEL_OUTOF10: 9

## 2024-01-09 ASSESSMENT — PAIN DESCRIPTION - ORIENTATION
ORIENTATION: LEFT;UPPER
ORIENTATION: LEFT

## 2024-01-09 ASSESSMENT — PAIN DESCRIPTION - LOCATION
LOCATION: BACK
LOCATION: CHEST

## 2024-01-09 NOTE — CONSULTS
History Of Present Illness  Heike Muñiz is a 83 y.o. female with a past medical history of mesenteric thrombus, meningioma, lung nodule, former nicotine use disorder presenting with chief complaint of left upper lobe lung nodule.  The patient arrived for her scheduled VATS with possible lobectomy on 1-9.  The patient was in her usual state of health in 2021 when she was found to  have a mesenteric thrombus which prompted further workup.  Lung nodule noted. Patient quit smoking 20 years ago, however lung nodule grew in size and was concerning for primary cancer of the left upper lobe. Found to have malignancy stage I lung cancer stage I lung cancer, amenable to resection.  Intensive care unit was consulted to assist the patient during   the tenuous postop period.     A 10 point ROS was performed with the patient denying any complaint at this time aside from those listed in the HPI above.   Past Medical History  She has a past medical history of Arthritis, Brain tumor (CMS/HCC), Broken bones, Cataract, Diverticulosis, DVT (deep venous thrombosis) (CMS/HCC), GERD (gastroesophageal reflux disease), Heart disease, Heartburn, HL (hearing loss), Hypercholesteremia, Meningitis, Personal history of other diseases of the musculoskeletal system and connective tissue (09/03/2015), Personal history of other endocrine, nutritional and metabolic disease (09/03/2015), Portal vein thrombosis, Rheumatoid arthritis (CMS/HCC), Sinusitis, and Vision loss.    Surgical History  She has a past surgical history that includes Colonoscopy (09/03/2015); Tonsillectomy (09/03/2015); Other surgical history (10/05/2015); Cholecystectomy; Adenoidectomy; Upper gastrointestinal endoscopy; Brain surgery; Carpal tunnel release; Cardiac catheterization; and Colon surgery.     Social History  She reports that she quit smoking about 20 years ago. Her smoking use included cigarettes. She has never used smokeless tobacco. She reports current alcohol use.  She reports that she does not use drugs.    Family History  Family History   Problem Relation Name Age of Onset    Anemia Mother      Heart attack Mother      Cancer Sister      Other (htn) Sister      Hyperlipidemia Sister          Allergies  Moxifloxacin, Azithromycin, Acetaminophen, Adhes. band-tape-benzalkonium, Amoxicillin, Antacid ii, Aspirin, Codeine-cpm-pe-acetaminophen, Egg, Egg yolk, Hydrocodone, Hydrocodone-acetaminophen, Metronidazole, Mylanta-ii, and Miconazole           Physical Exam  Constitutional: No respiratory acute distress, cooperative, answers questions appropriately, elderly obese woman  Eyes: EOMI, clear sclera  ENMT: mucous membranes moist, NC  Head/Neck: Neck supple, Trachea midline  Respiratory/Thorax: CTAB, no wheezes  Cardiovascular: RRR, distal pulses 2+, no edema  Gastrointestinal: non tender, no palpable masses  Musculoskeletal: ROM intact, no gross deformity  Neurological: No focal deficits, normal sensation  Psychological: Appropriate mood and behavior  Skin: Warm and dry, ct draining serosanguinous fluid     Last Recorded Vitals  /62   Pulse 74   Temp 36.1 °C (97 °F)   Resp (!) 8   Wt 98 kg (216 lb 0.8 oz)   SpO2 96%     Relevant Results  Scheduled medications  calcium carbonate-vitamin D3, 2 tablet, oral, Daily  cholecalciferol, 5,000 Units, oral, Daily  ciprofloxacin, 1 drop, Right Eye, BID  cyanocobalamin, 1,000 mcg, oral, Daily  [START ON 1/10/2024] enoxaparin, 40 mg, subcutaneous, Daily  fluticasone, 1 spray, Each Nostril, Daily  gabapentin, 300 mg, oral, BID  ipratropium-albuteroL, 3 mL, nebulization, q6h  magnesium oxide, 400 mg, oral, Daily  melatonin, 6 mg, oral, Nightly  metoprolol tartrate, 12.5 mg, oral, BID  pantoprazole, 40 mg, oral, BID  [START ON 1/10/2024] pantoprazole, 40 mg, intravenous, Daily before breakfast  sennosides-docusate sodium, 2 tablet, oral, BID  simvastatin, 40 mg, oral, Nightly        Continuous medications     PRN medications  PRN  medications: dextrose 10 % in water (D10W), dextrose, fentaNYL, glucagon, lubricating eye drops, oxyCODONE, oxyCODONE  Results for orders placed or performed during the hospital encounter of 01/09/24 (from the past 24 hour(s))   Basic metabolic panel   Result Value Ref Range    Glucose 163 (H) 74 - 99 mg/dL    Sodium 139 136 - 145 mmol/L    Potassium 4.4 3.5 - 5.3 mmol/L    Chloride 108 (H) 98 - 107 mmol/L    Bicarbonate 22 21 - 32 mmol/L    Anion Gap 13 10 - 20 mmol/L    Urea Nitrogen 17 6 - 23 mg/dL    Creatinine 0.69 0.50 - 1.05 mg/dL    eGFR 87 >60 mL/min/1.73m*2    Calcium 9.2 8.6 - 10.3 mg/dL   CBC   Result Value Ref Range    WBC 15.4 (H) 4.4 - 11.3 x10*3/uL    nRBC 0.0 0.0 - 0.0 /100 WBCs    RBC 5.27 (H) 4.00 - 5.20 x10*6/uL    Hemoglobin 15.2 12.0 - 16.0 g/dL    Hematocrit 49.5 (H) 36.0 - 46.0 %    MCV 94 80 - 100 fL    MCH 28.8 26.0 - 34.0 pg    MCHC 30.7 (L) 32.0 - 36.0 g/dL    RDW 12.5 11.5 - 14.5 %    Platelets 202 150 - 450 x10*3/uL   Magnesium   Result Value Ref Range    Magnesium 2.05 1.60 - 2.40 mg/dL     [unfilled]      Assessment/Plan   Assessment & Plan:  Heike Muñiz is a 83 y.o. female with a past medical history of mesenteric thrombus, meningioma, lung nodule, former nicotine use disorder presenting for VATS and possible  NIC lobectomy 1/9.    Neurological System:  Chronic neuropathy  History of meningioma status postresection  -Baseline mentation AO x3, will reassess   -Avoid excessive caths/ lines, monitor for ICU delirium  -Continue home gabapentin as needed oxycodone and fentanyl    Cardiovascular System:  -Monitor hemodynamics closely to maintain MAP >65  -Hold home antihypertensive while in the ICU  -Continue home statin  -Will give metoprolol at reduced dose per CT surgery team recommendations    Respiratory System:  Left upper lobe lung nodule-concerning for malignancy, s/p Left upper lobe wedge video-assisted thoracoscopy, possible lobectomy with lymphatic node dissection   -Oxygen as  needed, currently on 4 L nasal cannula  -PRN breathing treatments    Gastrointestinal System:  GERD  -Continue PPI daily    Endocrine System:  -Accu-checks, SSI, hypoglycemia    Renal/ System:  -Trend BMP, monitor UOP, optimize electrolytes     Hematological System:  History of thrombus of the superior mesenteric vein, on Eliquis  -On Eliquis 2.5 twice daily.  Follows with heme-onc.  Thrombus occurred 2021. Holding eliquis at this time.  -monitor CBC    Infection Disease System:  -Monitor for signs of infection    Vent/O2: 4 L nasal cannula  Lines/Devices: Peripheral IV, chest tube  Drips/Fluids: As needed fluid boluses  DVT PPX: SCDs, lovenox  Code: Full code    Ani Boland M.D. PGY-2

## 2024-01-09 NOTE — H&P
[facility]   Thoracic Surgery     History Of Present Illness  Heike Muñiz is a 83 y.o. female with a PMH significant for L Lung Nodule, HLD, superior mesenteric vein thrombosis (May 2021, on apixaban 2.5mg BID) and previous nicotine use disorder who presents to [facility] on 1/9/2024 for a staged L VATS with Wedge Resection and potential lobectomy.     The patient was previously found to be with a blood clot, where she had underwent CT imaging of her chest; she was incidentally found to have  a L upper lung nodule. On 10/31/23, she underwent a CT guided biopsy, which indicated malignancy.     She was seen in consult by thoracic surgeon, Dr. Enrique Sorenson. It was felt that the patient was with a new diagnosis of stage I lung cancer. She was subsequently staged for a L VATS with wedge resection and possible lobectomy.      Subjective    Past Medical History  She has a past medical history of Arthritis, Brain tumor (CMS/HCC), Broken bones, Cataract, Diverticulosis, DVT (deep venous thrombosis) (CMS/HCC), GERD (gastroesophageal reflux disease), Heart disease, Heartburn, HL (hearing loss), Hypercholesteremia, Meningitis, Personal history of other diseases of the musculoskeletal system and connective tissue (09/03/2015), Personal history of other endocrine, nutritional and metabolic disease (09/03/2015), Portal vein thrombosis, Rheumatoid arthritis (CMS/HCC), Sinusitis, and Vision loss.    Surgical History  She has a past surgical history that includes Colonoscopy (09/03/2015); Tonsillectomy (09/03/2015); Other surgical history (10/05/2015); Cholecystectomy; Adenoidectomy; Upper gastrointestinal endoscopy; Brain surgery; Carpal tunnel release; Cardiac catheterization; and Colon surgery.     Social History  She reports that she quit smoking about 20 years ago. Her smoking use included cigarettes. She has never used smokeless tobacco. She reports current  alcohol use. She reports that she does not use drugs.    Family History  Family History   Problem Relation Name Age of Onset    Anemia Mother      Heart attack Mother      Cancer Sister      Other (htn) Sister      Hyperlipidemia Sister          Allergies  Moxifloxacin, Azithromycin, Acetaminophen, Adhes. band-tape-benzalkonium, Amoxicillin, Antacid ii, Aspirin, Codeine-cpm-pe-acetaminophen, Egg, Egg yolk, Hydrocodone, Hydrocodone-acetaminophen, Metronidazole, Mylanta-ii, and Miconazole    Review of Systems   Unable to perform ROS: Acuity of condition (Emerging from OR Sedation)             Objective    Home Medications  Current Outpatient Medications   Medication Instructions    calcium carbonate/vitamin D3 (CALCIUM 600 + D,3, ORAL) 2 tablets, oral, Daily    cholecalciferol (VITAMIN D3) 125 mcg, oral, Daily    ciprofloxacin (Ciloxan) 0.3 % ophthalmic solution 1 drop, Right Eye, 2 times daily, Apply to affected left finger nail    cyanocobalamin (VITAMIN B-12) 1,000 mcg, oral, Daily    Eliquis 2.5 mg, oral, 2 times daily    fluticasone (Flonase Allergy Relief) 50 mcg/actuation nasal spray 1 spray, Each Nostril, Daily PRN    gabapentin (NEURONTIN) 300 mg, oral, 2 times daily    melatonin 6 mg, oral, Nightly    mv-min-folic acid-lutein (Essential Woman 50 Plus) 0.4-250 mg-mcg tablet 1 tablet, oral, Daily    pantoprazole (PROTONIX) 40 mg, oral, 2 times daily    simvastatin (ZOCOR) 40 mg, oral, Nightly    TURMERIC ORAL 1 tablet, oral, Daily        Inpatient Medications   Scheduled medications  acetaminophen, 975 mg, oral, Once  famotidine, 20 mg, intravenous, Once  metoprolol, 5 mg, intravenous, Once  scopolamine, 1 patch, transdermal, Once      Continuous medications  lactated Ringer's, 100 mL/hr      PRN medications  PRN medications: albuterol, BUPivacaine HCl, diphenhydrAMINE, hydrALAZINE, HYDROmorphone, labetaloL, meperidine, midazolam, morphine, ondansetron, sodium chloride, sterile water   Physical  Exam  Constitutional:       General: She is awake. She is not in acute distress.     Appearance: Normal appearance. She is ill-appearing. She is not toxic-appearing.      Interventions: Nasal cannula in place.   HENT:      Head: Normocephalic and atraumatic.      Nose: Nose normal.      Mouth/Throat:      Mouth: Mucous membranes are moist.      Pharynx: Oropharynx is clear.   Eyes:      Extraocular Movements: Extraocular movements intact.      Conjunctiva/sclera: Conjunctivae normal.      Pupils: Pupils are equal, round, and reactive to light.   Cardiovascular:      Rate and Rhythm: Normal rate and regular rhythm.      Pulses: Normal pulses.      Heart sounds: Normal heart sounds, S1 normal and S2 normal. No murmur heard.     No systolic murmur is present.      No friction rub. No gallop.   Pulmonary:      Effort: Pulmonary effort is normal.      Breath sounds: Examination of the right-lower field reveals decreased breath sounds. Examination of the left-lower field reveals decreased breath sounds. Decreased breath sounds present.   Abdominal:      General: Abdomen is flat. Bowel sounds are normal. There is no distension.      Palpations: Abdomen is soft.   Musculoskeletal:         General: Normal range of motion.      Cervical back: Normal range of motion and neck supple.      Right lower leg: No edema.      Left lower leg: No edema.   Skin:     General: Skin is warm and dry.      Capillary Refill: Capillary refill takes less than 2 seconds.      Findings: No lesion.      Nails: There is no clubbing.   Neurological:      General: No focal deficit present.      Mental Status: She is alert and oriented to person, place, and time. Mental status is at baseline.      Cranial Nerves: Cranial nerves 2-12 are intact.      Sensory: Sensation is intact.      Motor: Motor function is intact.   Psychiatric:         Attention and Perception: Attention and perception normal.         Mood and Affect: Mood normal.         Speech:  "Speech normal.         Behavior: Behavior normal.         Thought Content: Thought content normal.         Cognition and Memory: Cognition and memory normal.         Judgment: Judgment normal.          Last Recorded Vitals  Blood pressure 147/67, pulse 66, temperature 36.2 °C (97.2 °F), temperature source Temporal, resp. rate 18, height 1.6 m (5' 2.99\"), weight 98 kg (216 lb 0.8 oz), SpO2 98 %.             Daily Progress Note    1/9/2024: Seen and assessed patient in the immediate post-op period. Emerging from OR anesthesia. Plan to recover in the inpatient setting.        Assessment & Plan       L Lung Nodule  - S/p L Robotic Upper Lobe Wedge Resection with Lobectomy on 1/09/24 with Thoracic Surgeon, Dr. Enrique Sorenson   - Maintain chest tube to suction for now   - Follow CXRs  - Follow up with pathology & hematology-oncology in the OP setting       Acute Respiratory Insufficiency   - As anticipated in the immediate post operative period   - Current O2 Requirements: 4L NC  - Pulm hygiene   - IS/Deep Breathing        Risk for Post-Operative Arrythmia   - Given thoracic surgery, patient is at risk for development of atrial fibrilllation   - Plan to schedule on metoprolol tartrate 12.5mg BID  in the immediate post-op period and continue for 30 days  - Observe & replace electrolytes       Post Operative Pain   - Scheduled Acetaminophen 650 mg q6h  - PRN Oxycodone & fentanyl   - Can add magnesium oxide and gabapentin if pain is ongoing        Hyperlipidemia  - Home Medication: Simvastatin 40mg   - Resumed        Prophylaxis  - GI: Pantoprazole 40mg PO   - DVT: Enoxaparin, SCDs  - PT/OT Eval     Dispo   - CVICU    Above patient and paln discussed with thoracic surgeon, Dr. Enrique Sorenson.     I spent 55 minutes in the professional and overall care of this patient.      Claude Kim, APRN-CNP   "

## 2024-01-09 NOTE — CARE PLAN
The patient's goals for the shift include      The clinical goals for the shift include      Over the shift, the patient did not make progress toward the following goals. Patient will maintain an Spo2 of % on RA.

## 2024-01-09 NOTE — ANESTHESIA PROCEDURE NOTES
Arterial Line:    Date/Time: 1/9/2024 8:00 AM    Staffing  Performed: [facility]   Authorized by: Yunior Olmos MD    Performed by: JESICA Tucker    An arterial line was placed. Procedure performed using surface landmarks.in the OR for the following indication(s): continuous blood pressure monitoring.    A 20 gauge (size), 1 and 3/4 inch (length), Arrow (type) catheter was placed into the Right radial artery, secured by tape,   Seldinger technique used.  Events:  patient tolerated procedure well with no complications.

## 2024-01-09 NOTE — ANESTHESIA POSTPROCEDURE EVALUATION
Patient: Heike Muñiz    Procedure Summary       Date: 01/09/24 Room / Location: [facility] OR 09 / Virtual [facility] OR    Anesthesia Start: 0744 Anesthesia Stop: 1054    Procedures:       ROBOT ASSIST LEFT UPPER LOBE WEDGE VIDEO ASSIST THORACOSCOPY (Left)      POSSIBLE LOBECTOMY (Left) Diagnosis:       Primary cancer of left upper lobe of lung (CMS/HCC)      (Primary cancer of left upper lobe of lung (CMS/HCC) [C34.12])    Surgeons: Enrique Sorenson MD Responsible Provider: Yunior Olmos MD    Anesthesia Type: general ASA Status: 3            Anesthesia Type: general    Vitals Value Taken Time   /68 01/09/24 1053   Temp 36.4 01/09/24 1055   Pulse 96 01/09/24 1053   Resp 18 01/09/24 1055   SpO2 100 % 01/09/24 1053   Vitals shown include unvalidated device data.    Anesthesia Post Evaluation    Patient participation: complete - patient participated  Level of consciousness: awake  Pain management: adequate  Airway patency: patent  Cardiovascular status: acceptable  Respiratory status: acceptable  Hydration status: acceptable  Postoperative Nausea and Vomiting: none        No notable events documented.

## 2024-01-09 NOTE — OP NOTE
ROBOT ASSIST LEFT UPPER LOBE WEDGE VIDEO ASSIST THORACOSCOPY (L), POSSIBLE LOBECTOMY (L) Operative Note  Patient: Heike Muñiz  : 1940  MRN: [id number]    Preoperative Diagnosis: Primary cancer of left upper lobe of lung (CMS/HCC) [C34.12]  Postoperative Diagnosis: Primary cancer of left upper lobe of lung (CMS/HCC) [C34.12]    Procedure: Procedure(s) (LRB):  ROBOT ASSIST LEFT UPPER LOBE WEDGE VIDEO ASSIST THORACOSCOPY (Left)  POSSIBLE LOBECTOMY (Left)    Surgeon: Surgeon(s):  Enrique Sorenson MD    Other Staff:   Surgeon(s) and Role:   Circulator: Barbara Moura RN; Yeny Osorio RN  Scrub Person: Leyla Ordonez; Oliver Moulton    Anesthesia Type: General    Indications: Heike Muñiz is an 83 y.o. female who presents for Primary cancer of left upper lobe of lung (CMS/HCC) [C34.12].  I recommended surgical resection with mediastinal lymph node dissection.    The patient was seen in the preoperative area. The risks, benefits, complications, treatment options, non-operative alternatives, expected recovery and outcomes were discussed with the patient. The possibilities of reaction to medication, pulmonary aspiration, injury to surrounding structures, bleeding, recurrent infection, the need for additional procedures, failure to diagnose a condition, and creating a complication requiring transfusion or operation were discussed with the patient. The patient concurred with the proposed plan, giving informed consent.  The site of surgery was properly noted/marked if necessary per policy.     Procedure Details:   The patient was placed on the operating table. A safety huddle was performed. General endotracheal anesthesia was initiated. Bronchoscopy was performed using the endotracheal tube which showed no evidence of endobronchial tumors or other lesions.  A double-lumen endotracheal tube was positioned and secured.    The patient was positioned in lateral decubitus position. The patient was prepped  using chlorhexidine.     I started making 5 robotic/thoracoscopic port incisions in the left chest. I placed multi-level intercostal nerve blocks.  We used CO2 insufflation. We docked the robot and explored the chest.      I identified a mass consistent with the patient's known abnormality in the Left upper lobe.  I decided to perform a wedge resection.  I performed a wide wedge resection using serial firings of endoscopic staplers until the specimen was completely freed.  After this, the specimen was placed in an Endo Catch bag and removed for pathologic evaluation.  After removal of the specimen from the patient, I felt that the surgical margins were concerning. I therefore sent the specimen for frozen section. The frozen section showed a positive margin.  The tissue adjacent to the staple line was quite thick, and I did not feel an additional wedge resection was technically feasible.  I therefore elected to perform an anatomic resection..      I performed a mediastinal lymph node dissection.  I divided the inferior pulmonary ligament intersubstance from levels 8 and 9.  I performed a posterior hilar dissection and took lymph nodes from level 7.  I continued my dissection in the superior hilum.  I did not appreciate any level 6 lymph nodes.  I took lymph nodes from level 5.  I also dissected the proximal pulmonary artery and superior pulmonary vein and detected a level 10 lymph node here which I took as well.  Lastly, I dissected a level 11 lymph node in the fissure.  All of these were handed off.    I began my dissection for the anatomic resection.  I divided the anterior fissure.  I encountered additional 11 lymph nodes.  Identified a large lingular pulmonary artery branch and divided this with robotic stapling.  Adjacent to this, I could identify the bronchus.  There was a level 12 lymph node here which I removed and handed off.  After I performed this dissection, I realized I could safely perform a lingular  resection and include virtually the entire previous wedge resection staple line.  I reviewed the CT scan and also felt this was clinically appropriate based on the location of the tumor.  I went about completing this lingular resection.  I divided the lingular branch of the superior pulmonary vein, and then the bronchus to the lingula.  I performed some additional dissection in this area to assure that the bronchial and vascular margins were appropriately included in the specimen.  I then divided lung parenchyma with a series of robotic stapling loads.  The specimen was placed in Endo Catch bag and removed.    The chest was evaluated for hemostasis and felt to be appropriate. I placed a 28F chest tube and re-expanded the lung. The lung expanded well. The instruments were removed. The wounds were closed in layers.  The skin was dressed with Dermabond.  The procedure was completed and patient was brought to Recovery without evidence of immediate complications.     This case was 20% more time-consuming than typical based on the extended time required for the frozen section and some of the technical complexity and the hilar dissection converting the case from a wedge resection to a segmental resection    Estimated blood loss:  20  Complications: none  Disposition: Recovery  Condition: stable    Attending Attestation: I was present and scrubbed for the entire procedure.    Specimens:   ID Type Source Tests Collected by Time   1 : Left upper lobe wedge Tissue LUNG WEDGE RESECTION LEFT (SPECIFY SITE) SURGICAL PATHOLOGY EXAM Enrique Sorenson MD 1/9/2024 0820   2 : Left level 11 Tissue LYMPH NODE PULMONARY (SPECIFY SITE) SURGICAL PATHOLOGY EXAM Enrique Sorenson MD 1/9/2024 0846   3 : Left level 10 Tissue LYMPH NODE PULMONARY (SPECIFY SITE) SURGICAL PATHOLOGY EXAM Enrique Sorenson MD 1/9/2024 0848   4 : Left level 5 Tissue LYMPH NODE PULMONARY (SPECIFY SITE) SURGICAL PATHOLOGY EXAM Enrique Sorenson MD 1/9/2024  0849   5 : Left level 8 Tissue LYMPH NODE PULMONARY (SPECIFY SITE) SURGICAL PATHOLOGY EXAM Enrique Sorenson MD 1/9/2024 0850   6 : Left level 9 Tissue LYMPH NODE PULMONARY (SPECIFY SITE) SURGICAL PATHOLOGY EXAM Enrique Sorenson MD 1/9/2024 0852   7 : level 7 Tissue LYMPH NODE PULMONARY (SPECIFY SITE) SURGICAL PATHOLOGY EXAM Enrique Sorenson MD 1/9/2024 0857       Enrique Sorenson  Phone Number: [phone]

## 2024-01-09 NOTE — ANESTHESIA PREPROCEDURE EVALUATION
Patient: Heike Muñiz    Procedure Information       Date/Time: 01/09/24 0730    Procedures:       ROBOT ASSIST LEFT UPPER LOBE WEDGE VIDEO ASSIST THORACOSCOPY (Left) - VATS LEFT UPPER LOBE WEDGE POSSIBLE LOBECTOMY      POSSIBLE LOBECTOMY (Left)    Location: [facility] / [facility]    Surgeons: Enrique Sorenson MD            Relevant Problems   Anesthesia (within normal limits)   (-) PONV (postoperative nausea and vomiting)      Pulmonary   (+) Primary cancer of left upper lobe of lung (CMS/HCC)       Clinical information reviewed:   Tobacco  Allergies  Meds   Med Hx  Surg Hx   Fam Hx  Soc Hx        NPO Detail:  NPO/Void Status  Carbohydrate Drink Given Prior to Surgery? : N  Date of Last Liquid: 01/08/24  Time of Last Liquid: 2100  Date of Last Solid: 01/08/24  Time of Last Solid: 1800  Last Intake Type: Solid meal  Time of Last Void: 0620         Physical Exam    Airway  Mallampati: II  TM distance: >3 FB  Neck ROM: full     Cardiovascular - normal exam  Rhythm: regular  Rate: normal     Dental - normal exam     Pulmonary - normal exam     Abdominal            Anesthesia Plan    History of general anesthesia?: yes  History of complications of general anesthesia?: no    ASA 3     general   (Double Lumen Tube  A-line  R/B/A discussed with patient and family.  Discussed possibility of invasive monitoring, +/- Blood Products, +/-Postop ventilation.  Patient understands and wishes to proceed )  The patient is not a current smoker.  Education provided regarding risk of obstructive sleep apnea.  intravenous induction   Anesthetic plan and risks discussed with patient.    Plan discussed with CRNA, CAA and attending.

## 2024-01-09 NOTE — PERIOPERATIVE NURSING NOTE
Dr Olmos notified that pt rubbed her left eye and it is currently hurting her and reddened. Also notified of A-line dampened wave.

## 2024-01-10 ENCOUNTER — APPOINTMENT (OUTPATIENT)
Dept: RADIOLOGY | Facility: HOSPITAL | Age: 84
DRG: 168 | End: 2024-01-10
Payer: MEDICARE

## 2024-01-10 LAB
ANION GAP SERPL CALC-SCNC: 10 MMOL/L (ref 10–20)
BASOPHILS # BLD AUTO: 0.02 X10*3/UL (ref 0–0.1)
BASOPHILS NFR BLD AUTO: 0.2 %
BUN SERPL-MCNC: 19 MG/DL (ref 6–23)
CALCIUM SERPL-MCNC: 9.6 MG/DL (ref 8.6–10.3)
CHLORIDE SERPL-SCNC: 105 MMOL/L (ref 98–107)
CO2 SERPL-SCNC: 28 MMOL/L (ref 21–32)
CREAT SERPL-MCNC: 0.69 MG/DL (ref 0.5–1.05)
EGFRCR SERPLBLD CKD-EPI 2021: 87 ML/MIN/1.73M*2
EOSINOPHIL # BLD AUTO: 0.02 X10*3/UL (ref 0–0.4)
EOSINOPHIL NFR BLD AUTO: 0.2 %
ERYTHROCYTE [DISTWIDTH] IN BLOOD BY AUTOMATED COUNT: 13.2 % (ref 11.5–14.5)
GLUCOSE SERPL-MCNC: 108 MG/DL (ref 74–99)
HCT VFR BLD AUTO: 40.4 % (ref 36–46)
HGB BLD-MCNC: 12.4 G/DL (ref 12–16)
IMM GRANULOCYTES # BLD AUTO: 0.03 X10*3/UL (ref 0–0.5)
IMM GRANULOCYTES NFR BLD AUTO: 0.3 % (ref 0–0.9)
LYMPHOCYTES # BLD AUTO: 1.08 X10*3/UL (ref 0.8–3)
LYMPHOCYTES NFR BLD AUTO: 10.9 %
MAGNESIUM SERPL-MCNC: 2.13 MG/DL (ref 1.6–2.4)
MCH RBC QN AUTO: 28.6 PG (ref 26–34)
MCHC RBC AUTO-ENTMCNC: 30.7 G/DL (ref 32–36)
MCV RBC AUTO: 93 FL (ref 80–100)
MONOCYTES # BLD AUTO: 1.5 X10*3/UL (ref 0.05–0.8)
MONOCYTES NFR BLD AUTO: 15.2 %
NEUTROPHILS # BLD AUTO: 7.23 X10*3/UL (ref 1.6–5.5)
NEUTROPHILS NFR BLD AUTO: 73.2 %
NRBC BLD-RTO: 0 /100 WBCS (ref 0–0)
PLATELET # BLD AUTO: 209 X10*3/UL (ref 150–450)
POTASSIUM SERPL-SCNC: 5.1 MMOL/L (ref 3.5–5.3)
RBC # BLD AUTO: 4.34 X10*6/UL (ref 4–5.2)
SODIUM SERPL-SCNC: 138 MMOL/L (ref 136–145)
WBC # BLD AUTO: 9.9 X10*3/UL (ref 4.4–11.3)

## 2024-01-10 PROCEDURE — 97161 PT EVAL LOW COMPLEX 20 MIN: CPT | Mod: GP

## 2024-01-10 PROCEDURE — 71045 X-RAY EXAM CHEST 1 VIEW: CPT | Performed by: STUDENT IN AN ORGANIZED HEALTH CARE EDUCATION/TRAINING PROGRAM

## 2024-01-10 PROCEDURE — 71045 X-RAY EXAM CHEST 1 VIEW: CPT | Performed by: RADIOLOGY

## 2024-01-10 PROCEDURE — 97165 OT EVAL LOW COMPLEX 30 MIN: CPT | Mod: GO

## 2024-01-10 PROCEDURE — 2500000002 HC RX 250 W HCPCS SELF ADMINISTERED DRUGS (ALT 637 FOR MEDICARE OP, ALT 636 FOR OP/ED): Performed by: NURSE PRACTITIONER

## 2024-01-10 PROCEDURE — 85025 COMPLETE CBC W/AUTO DIFF WBC: CPT

## 2024-01-10 PROCEDURE — 99233 SBSQ HOSP IP/OBS HIGH 50: CPT | Performed by: NURSE PRACTITIONER

## 2024-01-10 PROCEDURE — 94640 AIRWAY INHALATION TREATMENT: CPT

## 2024-01-10 PROCEDURE — 71045 X-RAY EXAM CHEST 1 VIEW: CPT

## 2024-01-10 PROCEDURE — 36415 COLL VENOUS BLD VENIPUNCTURE: CPT

## 2024-01-10 PROCEDURE — 83735 ASSAY OF MAGNESIUM: CPT

## 2024-01-10 PROCEDURE — 2500000001 HC RX 250 WO HCPCS SELF ADMINISTERED DRUGS (ALT 637 FOR MEDICARE OP): Performed by: NURSE PRACTITIONER

## 2024-01-10 PROCEDURE — 2020000001 HC ICU ROOM DAILY

## 2024-01-10 PROCEDURE — 80048 BASIC METABOLIC PNL TOTAL CA: CPT | Performed by: NURSE PRACTITIONER

## 2024-01-10 PROCEDURE — 2500000004 HC RX 250 GENERAL PHARMACY W/ HCPCS (ALT 636 FOR OP/ED): Performed by: NURSE PRACTITIONER

## 2024-01-10 RX ADMIN — PANTOPRAZOLE SODIUM 40 MG: 40 TABLET, DELAYED RELEASE ORAL at 09:41

## 2024-01-10 RX ADMIN — METOPROLOL TARTRATE 12.5 MG: 25 TABLET, FILM COATED ORAL at 20:13

## 2024-01-10 RX ADMIN — SENNOSIDES AND DOCUSATE SODIUM 2 TABLET: 8.6; 5 TABLET ORAL at 20:13

## 2024-01-10 RX ADMIN — MAGNESIUM OXIDE TAB 400 MG (241.3 MG ELEMENTAL MG) 400 MG: 400 (241.3 MG) TAB at 09:40

## 2024-01-10 RX ADMIN — Medication 2 TABLET: at 09:00

## 2024-01-10 RX ADMIN — METOPROLOL TARTRATE 12.5 MG: 25 TABLET, FILM COATED ORAL at 09:40

## 2024-01-10 RX ADMIN — ENOXAPARIN SODIUM 40 MG: 40 INJECTION SUBCUTANEOUS at 09:39

## 2024-01-10 RX ADMIN — IPRATROPIUM BROMIDE AND ALBUTEROL SULFATE 3 ML: .5; 3 SOLUTION RESPIRATORY (INHALATION) at 12:20

## 2024-01-10 RX ADMIN — GABAPENTIN 300 MG: 300 CAPSULE ORAL at 20:12

## 2024-01-10 RX ADMIN — CIPROFLOXACIN HYDROCHLORIDE 1 DROP: 3 SOLUTION/ DROPS OPHTHALMIC at 20:14

## 2024-01-10 RX ADMIN — IPRATROPIUM BROMIDE AND ALBUTEROL SULFATE 3 ML: .5; 3 SOLUTION RESPIRATORY (INHALATION) at 19:48

## 2024-01-10 RX ADMIN — GABAPENTIN 300 MG: 300 CAPSULE ORAL at 09:40

## 2024-01-10 RX ADMIN — PANTOPRAZOLE SODIUM 40 MG: 40 TABLET, DELAYED RELEASE ORAL at 20:12

## 2024-01-10 RX ADMIN — IPRATROPIUM BROMIDE AND ALBUTEROL SULFATE 3 ML: .5; 3 SOLUTION RESPIRATORY (INHALATION) at 08:05

## 2024-01-10 RX ADMIN — CYANOCOBALAMIN TAB 1000 MCG 1000 MCG: 1000 TAB at 09:40

## 2024-01-10 RX ADMIN — OXYCODONE HYDROCHLORIDE 5 MG: 5 TABLET ORAL at 12:38

## 2024-01-10 RX ADMIN — SIMVASTATIN 40 MG: 20 TABLET, FILM COATED ORAL at 20:13

## 2024-01-10 RX ADMIN — OXYCODONE HYDROCHLORIDE 5 MG: 5 TABLET ORAL at 06:22

## 2024-01-10 RX ADMIN — Medication 6 MG: at 20:12

## 2024-01-10 RX ADMIN — CHOLECALCIFEROL TAB 25 MCG (1000 UNIT) 5000 UNITS: 25 TAB at 09:40

## 2024-01-10 RX ADMIN — IPRATROPIUM BROMIDE AND ALBUTEROL SULFATE 3 ML: .5; 3 SOLUTION RESPIRATORY (INHALATION) at 16:22

## 2024-01-10 RX ADMIN — SENNOSIDES AND DOCUSATE SODIUM 2 TABLET: 8.6; 5 TABLET ORAL at 09:40

## 2024-01-10 ASSESSMENT — ENCOUNTER SYMPTOMS
DIZZINESS: 0
BLURRED VISION: 0
COUGH: 0
POOR WOUND HEALING: 0
FOCAL WEAKNESS: 0
FREQUENCY: 0
DECREASED APPETITE: 0
ORTHOPNEA: 0
PALPITATIONS: 0
ALTERED MENTAL STATUS: 0
DOUBLE VISION: 0
DYSPNEA ON EXERTION: 0
MYALGIAS: 0
STIFFNESS: 0
VOMITING: 0
WEAKNESS: 0
NAUSEA: 0
MUSCLE CRAMPS: 0
BLOATING: 0
SHORTNESS OF BREATH: 0
NAIL CHANGES: 0
IRREGULAR HEARTBEAT: 0
LIGHT-HEADEDNESS: 0

## 2024-01-10 ASSESSMENT — PAIN SCALES - GENERAL
PAINLEVEL_OUTOF10: 3
PAINLEVEL_OUTOF10: 8
PAINLEVEL_OUTOF10: 3
PAINLEVEL_OUTOF10: 5 - MODERATE PAIN

## 2024-01-10 ASSESSMENT — COGNITIVE AND FUNCTIONAL STATUS - GENERAL
DRESSING REGULAR LOWER BODY CLOTHING: TOTAL
DAILY ACTIVITIY SCORE: 15
DRESSING REGULAR UPPER BODY CLOTHING: A LITTLE
MOVING TO AND FROM BED TO CHAIR: A LITTLE
CLIMB 3 TO 5 STEPS WITH RAILING: A LOT
TURNING FROM BACK TO SIDE WHILE IN FLAT BAD: A LITTLE
TOILETING: A LOT
PERSONAL GROOMING: A LITTLE
STANDING UP FROM CHAIR USING ARMS: A LITTLE
MOBILITY SCORE: 17
WALKING IN HOSPITAL ROOM: A LITTLE
HELP NEEDED FOR BATHING: A LOT
MOVING FROM LYING ON BACK TO SITTING ON SIDE OF FLAT BED WITH BEDRAILS: A LITTLE

## 2024-01-10 ASSESSMENT — PAIN - FUNCTIONAL ASSESSMENT
PAIN_FUNCTIONAL_ASSESSMENT: 0-10
PAIN_FUNCTIONAL_ASSESSMENT: 0-10

## 2024-01-10 ASSESSMENT — PAIN DESCRIPTION - ORIENTATION: ORIENTATION: LEFT;UPPER

## 2024-01-10 NOTE — CARE PLAN
The patient's goals for the shift include      The clinical goals for the shift include Decreased O2 demand and decreased episodes of dyspnea.      Problem: Pain  Goal: My pain/discomfort is manageable  1/10/2024 0701 by Enrique Thomason RN  Outcome: Progressing  1/10/2024 0701 by Enrique Thomason RN  Outcome: Progressing     Problem: Safety  Goal: Patient will be injury free during hospitalization  1/10/2024 0701 by Enrique Thomason RN  Outcome: Progressing  1/10/2024 0701 by Enrique Thomason RN  Outcome: Progressing  Goal: I will remain free of falls  1/10/2024 0701 by Enrique Thomason RN  Outcome: Progressing  1/10/2024 0701 by Enrique Thomason RN  Outcome: Progressing     Problem: Daily Care  Goal: Daily care needs are met  1/10/2024 0701 by Enrique Thmoason RN  Outcome: Progressing  1/10/2024 0701 by Enrique Thomason RN  Outcome: Progressing     Problem: Psychosocial Needs  Goal: Demonstrates ability to cope with hospitalization/illness  1/10/2024 0701 by Enrique Thomason RN  Outcome: Progressing  1/10/2024 0701 by Enrique Thomason RN  Outcome: Progressing  Goal: Collaborate with me, my family, and caregiver to identify my specific goals  1/10/2024 0701 by Enrique Thomason RN  Outcome: Progressing  1/10/2024 0701 by Enrique Thomason RN  Outcome: Progressing     Problem: Discharge Barriers  Goal: My discharge needs are met  1/10/2024 0701 by Enrique Thomason RN  Outcome: Progressing  1/10/2024 0701 by Enrique Thomason RN  Outcome: Progressing     Problem: Respiratory  Goal: Clear secretions with interventions this shift  1/10/2024 0701 by Enrique Thomason RN  Outcome: Progressing  1/10/2024 0701 by Enrique Thomason RN  Outcome: Progressing  Goal: Minimize anxiety/maximize coping throughout shift  1/10/2024 0701 by Enrique Thomason RN  Outcome: Progressing  1/10/2024 0701 by Enrique Thomason RN  Outcome: Progressing  Goal: Minimal/no exertional discomfort or  dyspnea this shift  1/10/2024 0701 by Enrique Thomason RN  Outcome: Progressing  1/10/2024 0701 by Enrique Thomason RN  Outcome: Progressing  Goal: No signs of respiratory distress (eg. Use of accessory muscles. Peds grunting)  1/10/2024 0701 by Enrique Thomason RN  Outcome: Progressing  1/10/2024 0701 by Enrique Thomason RN  Outcome: Progressing  Goal: Tolerate pulmonary toileting this shift  1/10/2024 0701 by Enrique Thomason RN  Outcome: Progressing  1/10/2024 0701 by Enrique Thomason RN  Outcome: Progressing  Goal: Verbalize decreased shortness of breath this shift  1/10/2024 0701 by Enrique Thomason RN  Outcome: Progressing  1/10/2024 0701 by Enrique Thomason RN  Outcome: Progressing  Goal: Wean oxygen to maintain O2 saturation per order/standard this shift  1/10/2024 0701 by Enrique Thomason RN  Outcome: Progressing  1/10/2024 0701 by Enrique Thomason RN  Outcome: Progressing  Goal: Increase self care and/or family involvement in next 24 hours  1/10/2024 0701 by Enrique Thomason RN  Outcome: Progressing  1/10/2024 0701 by Enrique Thomason RN  Outcome: Progressing     Problem: Pain  Goal: Takes deep breaths with improved pain control throughout the shift  1/10/2024 0701 by Enrique Thomason RN  Outcome: Progressing  1/10/2024 0701 by Enrique Thomason RN  Outcome: Progressing  Goal: Turns in bed with improved pain control throughout the shift  1/10/2024 0701 by Enrique Thomason RN  Outcome: Progressing  1/10/2024 0701 by Enrique Thomason RN  Outcome: Progressing  Goal: Walks with improved pain control throughout the shift  1/10/2024 0701 by Enrique Thomason RN  Outcome: Progressing  1/10/2024 0701 by Enrique Thomason RN  Outcome: Progressing  Goal: Performs ADL's with improved pain control throughout shift  1/10/2024 0701 by Enrique Thomason RN  Outcome: Progressing  1/10/2024 0701 by Enrique Thomason RN  Outcome: Progressing  Goal: Participates in PT  with improved pain control throughout the shift  1/10/2024 0701 by Enrique Thomason RN  Outcome: Progressing  1/10/2024 0701 by Enrique Thomason RN  Outcome: Progressing  Goal: Free from opioid side effects throughout the shift  1/10/2024 0701 by Enrique Thomason RN  Outcome: Progressing  1/10/2024 0701 by Enrique Thomason RN  Outcome: Progressing  Goal: Free from acute confusion related to pain meds throughout the shift  1/10/2024 0701 by Enrique Thomason RN  Outcome: Progressing  1/10/2024 0701 by Enrique Thomason RN  Outcome: Progressing

## 2024-01-10 NOTE — PROGRESS NOTES
Attempted to [facility] patient in room twice and listed cell phone with no answer. Patient is s/p VATS procedure and lives with .

## 2024-01-10 NOTE — PROGRESS NOTES
Occupational Therapy    Evaluation    Patient Name: Heike Muñiz  MRN: [id number]  Today's Date: 1/10/2024  Time Calculation  Start Time: 1143  Stop Time: 1159  Time Calculation (min): 16 min        Assessment:  End of Session Communication: Bedside nurse  End of Session Patient Position: Up in chair     Plan:  Treatment Interventions: ADL retraining, Functional transfer training, UE strengthening/ROM, Compensatory technique education  OT Frequency: 3 times per week  OT Discharge Recommendations: Low intensity level of continued care  OT - OK to Discharge: Yes (to next level of care when cleared by medical team)  Treatment Interventions: ADL retraining, Functional transfer training, UE strengthening/ROM, Compensatory technique education    Subjective   Current Problem:  1. Primary cancer of left upper lobe of lung (CMS/HCC)  Surgical Pathology Exam    Surgical Pathology Exam      2. Pre-op testing  ECG 12 lead        General:  General  Reason for Referral: impaired adl  Past Medical History Relevant to Rehab: pt. admitted s/p staged L VATS with wedge resection and potential lobectomy, new dx stage 1 lung ca pmh:  L lung nodule, hld, superior mesenteric vein thrombosis, nicotine use disorder, arthritis, brain tumor, gerd, RA  Family/Caregiver Present: Yes  Caregiver Feedback: spouse supportive  Prior to Session Communication: Bedside nurse  Patient Position Received: Up in chair  General Comment: pt. resting in bed, agreeable to therapy intervention  Precautions:  Precautions Comment: chest tub, pt. allowed to ambulate or be transported off division without suction  Vital Signs:     Pain:  Pain Assessment  Pain Assessment:  (6/10 L shoulder/back (chest tube insertion))    Objective   Cognition:  Overall Cognitive Status: Within Functional Limits           Home Living:  Home Living Comments: pt. lives with spouse who is good health, 1 floor home, 2 step entry with rail, st. cane, tub/shower with gb, seat, hhs, RTS  Prior  Function:  Prior Function Comments: pt. independent with adl, drives, shares iadl tasks, use of st. cane for mobility  IADL History:     ADL:  ADL Comments: max assist for LB adl due to pain from chest tube when reaching, would estimate standing aspects of adl to require cga/min assist x 1  Activity Tolerance:     Bed Mobility/Transfers: Bed Mobility  Bed Mobility:  (min assist x 1 supine to sit)    Transfers  Transfer:  (sit to stand from eob, stand to sit into recliner min assist x 1)      Ambulation/Gait Training:  Ambulation/Gait Training  Ambulation/Gait Training Performed:  (mobility bed to chair via wh. walker required min assist x 1)     Strength:  Strength Comments:  bilaterally wfl, proximal at least 3/5    Outcome Measures:[facility] Daily Activity  Putting on and taking off regular lower body clothing: Total  Bathing (including washing, rinsing, drying): A lot  Putting on and taking off regular upper body clothing: A little  Toileting, which includes using toilet, bedpan or urinal: A lot  Taking care of personal grooming such as brushing teeth: A little  Eating Meals: None  Daily Activity - Total Score: 15        Education Documentation  Precautions, taught by Samantha Henderson OT at 1/10/2024  3:43 PM.  Learner: Patient  Readiness: Acceptance  Method: Explanation  Response: Verbalizes Understanding, Needs Reinforcement    ADL Training, taught by Samantha Henderson OT at 1/10/2024  3:43 PM.  Learner: Patient  Readiness: Acceptance  Method: Explanation  Response: Verbalizes Understanding, Needs Reinforcement    Education Comments  No comments found.        OP EDUCATION:       Goals:  Encounter Problems       Encounter Problems (Active)       OT Goals       increase bue ther ex/activity x 7-10 minutes and increase standing tolerance with supervision x 3-5 minutes to promote greater activity tolerance for assist with adl.   (Progressing)       Start:  01/10/24    Expected End:  01/17/24            Increase functional  mobility and  functional transfers to supervision for bed/chair/toilet with dme prn   (Progressing)       Start:  01/10/24    Expected End:  01/17/24            Increase lb dressing/bathing to supervision with dme prn  (Progressing)       Start:  01/10/24    Expected End:  01/17/24            Increase toileting to supervision with dme prn  (Progressing)       Start:  01/10/24    Expected End:  01/17/24            pt. to apply ec/ws techniques with minimal cues to all mobility/transfer/adl to decrease fatigue/promote efficient use of energy toward completion of functional tasks.  (Progressing)       Start:  01/10/24    Expected End:  01/17/24

## 2024-01-10 NOTE — PROGRESS NOTES
Physical Therapy    Physical Therapy Evaluation    Patient Name: Heike Muñiz  MRN: [id number]  Today's Date: 1/10/2024   Time Calculation  Start Time: 1144  Stop Time: 1202  Time Calculation (min): 18 min    Assessment/Plan   PT Assessment  PT Assessment Results: Decreased strength, Decreased endurance, Decreased mobility  Rehab Prognosis: Good  End of Session Communication: Bedside nurse  End of Session Patient Position: Up in chair  IP OR SWING BED PT PLAN  Inpatient or Swing Bed: Inpatient  PT Plan  Treatment/Interventions: Bed mobility, Transfer training, Gait training, Stair training, Endurance training  PT Plan: Skilled PT  PT Frequency: 4 times per week  PT Discharge Recommendations: Low intensity level of continued care  PT - OK to Discharge: Yes (when cleared by medical team)    Subjective     Current Problem:  Patient Active Problem List   Diagnosis    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  present during session    Home Living:  Home Living  Home Living Comments: pt. lives with spouse who is good health, 1 floor home, 2 step entry with rail, st. cane, tub/shower with gb, seat, hhs, RTS    Prior Level of Function:  Prior Function Per Pt/Caregiver Report  Prior Function Comments: pt. independent with adl, drives, shares iadl tasks, use of st. cane for mobility    Precautions:  Precautions  Medical Precautions: Fall precautions, Oxygen therapy device and  L/min  Precautions Comment: 2L O2, chest tube, tele    Vital Signs:  Vital Signs  Heart Rate:  (VSS throughout session)  Objective     Pain:  Pain Assessment  Pain Assessment:  (6/10 left shoulder and back pain post op per pt report)    Cognition:  Cognition  Overall Cognitive Status: Within Functional Limits    General Assessments:         Sensation  Light Touch: No apparent deficits  Strength  Strength Comments: BLE WFL         Functional Assessments:     Bed Mobility  Bed Mobility:  (supine<>sit with min assist)  Transfers  Transfer:  (sit to stand with min assist x 1, 2 attempts completed, first attempt not successful)  Ambulation/Gait Training  Ambulation/Gait Training Performed:  (Pt ambulates bed to chair with CGA with WW with no acute LOB noted. But cues provided for walker management and safety)       Outcome Measures:  [facility] Basic Mobility  Turning from your back to your side while in a flat bed without using bedrails: A little  Moving from lying on your back to sitting on the side of a flat bed without using bedrails: A little  Moving to and from bed to chair (including a wheelchair): A little  Standing up from a chair using your arms (e.g. wheelchair or bedside chair): A little  To walk in hospital room: A little  Climbing 3-5 steps with railing: A lot  Basic Mobility - Total Score: 17       Goals:  Encounter Problems       Encounter Problems (Active)       PT Problem       STG - Pt will amb 80' using WW with SBA  (Progressing)       Start:  01/10/24    Expected End:  01/24/24            STG - Pt will transition supine <> sitting with SUP (Progressing)       Start:  01/10/24    Expected End:  01/24/24            STG -  Pt will navigate 2-4 stairs using rail with CGA  (Progressing)       Start:  01/10/24    Expected End:  01/24/24            STG - Pt will transfer STS with SBA (Progressing)       Start:  01/10/24    Expected End:  01/24/24                 Education Documentation  Mobility Training, taught  by Rae Zavala, PT at 1/10/2024  4:18 PM.  Learner: Patient  Readiness: Acceptance  Method: Explanation  Response: Verbalizes Understanding    Education Comments  No comments found.

## 2024-01-10 NOTE — PROGRESS NOTES
Subjective:  The patient states that she slept well overnight. Endorses some mild chest pain that is associated with deep breathes. She was encouraged to use her incentive spirometry.     Objective:    Physical Exam  Constitutional: No respiratory acute distress, cooperative  Eyes: EOMI, clear sclera  ENMT: mucous membranes moist  Head/Neck: Neck supple, Trachea midline  Respiratory/Thorax: CTAB, no wheezes  Cardiovascular: RRR, distal pulses 2+, no edema  Gastrointestinal: non tender, no palpable masses  Musculoskeletal: ROM intact, no gross deformity  Neurological: No focal deficits, normal sensation  Psychological: Appropriate mood and behavior  Skin: Warm and dry, CT with serosanguinous OP     Last Recorded Vitals  /60   Pulse 64   Temp 36.9 °C (98.4 °F) (Temporal)   Resp 26   Wt 85.6 kg (188 lb 11.4 oz)   SpO2 94%     Relevant Results  Scheduled medications  calcium carbonate-vitamin D3, 2 tablet, oral, Daily  cholecalciferol, 5,000 Units, oral, Daily  ciprofloxacin, 1 drop, Right Eye, BID  cyanocobalamin, 1,000 mcg, oral, Daily  enoxaparin, 40 mg, subcutaneous, Daily  fluticasone, 1 spray, Each Nostril, Daily  gabapentin, 300 mg, oral, BID  ipratropium-albuteroL, 3 mL, nebulization, 4x daily  magnesium oxide, 400 mg, oral, Daily  melatonin, 6 mg, oral, Nightly  metoprolol tartrate, 12.5 mg, oral, BID  pantoprazole, 40 mg, oral, BID  sennosides-docusate sodium, 2 tablet, oral, BID  simvastatin, 40 mg, oral, Nightly        Continuous medications     PRN medications  PRN medications: dextrose 10 % in water (D10W), dextrose, fentaNYL, glucagon, ipratropium-albuteroL, lubricating eye drops, oxyCODONE, oxyCODONE  Results for orders placed or performed during the hospital encounter of 01/09/24 (from the past 24 hour(s))   Basic metabolic panel   Result Value Ref Range    Glucose 163 (H) 74 - 99 mg/dL    Sodium 139 136 - 145 mmol/L    Potassium 4.4 3.5 - 5.3 mmol/L    Chloride 108 (H) 98 - 107 mmol/L     Bicarbonate 22 21 - 32 mmol/L    Anion Gap 13 10 - 20 mmol/L    Urea Nitrogen 17 6 - 23 mg/dL    Creatinine 0.69 0.50 - 1.05 mg/dL    eGFR 87 >60 mL/min/1.73m*2    Calcium 9.2 8.6 - 10.3 mg/dL   CBC   Result Value Ref Range    WBC 15.4 (H) 4.4 - 11.3 x10*3/uL    nRBC 0.0 0.0 - 0.0 /100 WBCs    RBC 5.27 (H) 4.00 - 5.20 x10*6/uL    Hemoglobin 15.2 12.0 - 16.0 g/dL    Hematocrit 49.5 (H) 36.0 - 46.0 %    MCV 94 80 - 100 fL    MCH 28.8 26.0 - 34.0 pg    MCHC 30.7 (L) 32.0 - 36.0 g/dL    RDW 12.5 11.5 - 14.5 %    Platelets 202 150 - 450 x10*3/uL   Magnesium   Result Value Ref Range    Magnesium 2.05 1.60 - 2.40 mg/dL   Basic metabolic panel   Result Value Ref Range    Glucose 108 (H) 74 - 99 mg/dL    Sodium 138 136 - 145 mmol/L    Potassium 5.1 3.5 - 5.3 mmol/L    Chloride 105 98 - 107 mmol/L    Bicarbonate 28 21 - 32 mmol/L    Anion Gap 10 10 - 20 mmol/L    Urea Nitrogen 19 6 - 23 mg/dL    Creatinine 0.69 0.50 - 1.05 mg/dL    eGFR 87 >60 mL/min/1.73m*2    Calcium 9.6 8.6 - 10.3 mg/dL   CBC and Auto Differential   Result Value Ref Range    WBC 9.9 4.4 - 11.3 x10*3/uL    nRBC 0.0 0.0 - 0.0 /100 WBCs    RBC 4.34 4.00 - 5.20 x10*6/uL    Hemoglobin 12.4 12.0 - 16.0 g/dL    Hematocrit 40.4 36.0 - 46.0 %    MCV 93 80 - 100 fL    MCH 28.6 26.0 - 34.0 pg    MCHC 30.7 (L) 32.0 - 36.0 g/dL    RDW 13.2 11.5 - 14.5 %    Platelets 209 150 - 450 x10*3/uL    Neutrophils % 73.2 40.0 - 80.0 %    Immature Granulocytes %, Automated 0.3 0.0 - 0.9 %    Lymphocytes % 10.9 13.0 - 44.0 %    Monocytes % 15.2 2.0 - 10.0 %    Eosinophils % 0.2 0.0 - 6.0 %    Basophils % 0.2 0.0 - 2.0 %    Neutrophils Absolute 7.23 (H) 1.60 - 5.50 x10*3/uL    Immature Granulocytes Absolute, Automated 0.03 0.00 - 0.50 x10*3/uL    Lymphocytes Absolute 1.08 0.80 - 3.00 x10*3/uL    Monocytes Absolute 1.50 (H) 0.05 - 0.80 x10*3/uL    Eosinophils Absolute 0.02 0.00 - 0.40 x10*3/uL    Basophils Absolute 0.02 0.00 - 0.10 x10*3/uL   Magnesium   Result Value Ref Range     Magnesium 2.13 1.60 - 2.40 mg/dL     [unfilled]      Assessment/Plan     Daily Progress:  1/10: The patient is HDS. She is on several liters nasal cannula. Will need OP follow up of pathological findings. Oliguria, bladder scans ordered, recommend PRN fluid boluses if needed. ICU team signing off of care. Please re consult if needed.    Assessment & Plan:  Heike Muñiz is a 83 y.o. female with a past medical history of mesenteric thrombus, meningioma, lung nodule, former nicotine use disorder presenting for VATS and possible  NIC lobectomy 1/9.     Neurological System:  Chronic neuropathy  History of meningioma status postresection  -Baseline mentation AO x3, will reassess   -Avoid excessive caths/ lines, monitor for ICU delirium  -Continue home gabapentin as needed oxycodone and fentanyl     Cardiovascular System:  -Monitor hemodynamics closely to maintain MAP >65  -Hold home antihypertensive while in the ICU  -Continue home statin  -Will give metoprolol at reduced dose per CT surgery team recommendations     Respiratory System:  Left upper lobe lung nodule-concerning for malignancy, s/p Left upper lobe wedge video-assisted thoracoscopy, possible lobectomy with lymphatic node dissection   Acute post-op resp insufficiency  -Oxygen as needed, currently on 4 L nasal cannula  -PRN breathing treatments     Gastrointestinal System:  GERD  -Continue PPI daily     Endocrine System:  -Accu-checks, SSI, hypoglycemia     Renal/ System:  Oliguria  -Trend BMP, monitor UOP, optimize electrolytes   -PRN bladder scan, PRN fluid boluses     Hematological System:  History of thrombus of the superior mesenteric vein, on Eliquis  -On Eliquis 2.5 twice daily.  Follows with heme-onc.  Thrombus occurred 2021. Holding eliquis at this time.  -monitor CBC     Infection Disease System:  -Monitor for signs of infection     Vent/O2: 4 L nasal cannula  Lines/Devices: Peripheral IV, chest tube  Drips/Fluids: As needed fluid boluses  DVT PPX:  CONCEPCIONs, lovenox  Code: Full code     Ani Boland M.D. PGY-2

## 2024-01-10 NOTE — PROGRESS NOTES
Thoracic Surgery  Progress Note     Heike Muñiz is a 83 y.o. female on day 1 of admission presenting with Primary cancer of left upper lobe of lung (CMS/HCC).    Subjective     Interval HPI: Seen and assessed patient this AM while they were sitting up in bed; in no acute distress and without any complaints.     Review of Systems   Constitutional: Positive for malaise/fatigue. Negative for decreased appetite.   HENT:  Negative for congestion.    Eyes:  Negative for blurred vision and double vision.   Cardiovascular:  Negative for chest pain, dyspnea on exertion, irregular heartbeat, leg swelling, orthopnea and palpitations.   Respiratory:  Negative for cough and shortness of breath.    Endocrine: Negative for cold intolerance and heat intolerance.   Skin:  Negative for nail changes, poor wound healing and rash.   Musculoskeletal:  Negative for arthritis, muscle cramps, muscle weakness, myalgias and stiffness.   Gastrointestinal:  Negative for bloating, nausea and vomiting.   Genitourinary:  Negative for frequency, hesitancy and urgency.   Neurological:  Negative for dizziness, focal weakness, light-headedness and weakness.   Psychiatric/Behavioral:  Negative for altered mental status.    Allergic/Immunologic: Negative for environmental allergies.           Objective   Physical Exam  Physical Exam  Constitutional:       General: She is not in acute distress.     Appearance: Normal appearance. She is not ill-appearing.   HENT:      Head: Normocephalic and atraumatic.      Nose: Nose normal.      Mouth/Throat:      Mouth: Mucous membranes are moist.      Pharynx: Oropharynx is clear.   Eyes:      Extraocular Movements: Extraocular movements intact.      Conjunctiva/sclera: Conjunctivae normal.      Pupils: Pupils are equal, round, and reactive to light.   Cardiovascular:      Rate and Rhythm: Normal rate and regular rhythm.      Pulses: Normal pulses.      Heart sounds: Normal heart sounds, S1 normal and S2 normal. No  murmur heard.     No systolic murmur is present.      No friction rub. No gallop.   Pulmonary:      Effort: Pulmonary effort is normal.      Breath sounds: Examination of the right-lower field reveals decreased breath sounds. Examination of the left-lower field reveals decreased breath sounds. Decreased breath sounds present.   Abdominal:      General: Abdomen is flat. Bowel sounds are normal. There is no distension.      Palpations: Abdomen is soft.   Musculoskeletal:         General: Normal range of motion.      Cervical back: Normal range of motion and neck supple.      Right lower leg: No edema.      Left lower leg: No edema.   Skin:     General: Skin is warm and dry.      Capillary Refill: Capillary refill takes less than 2 seconds.      Findings: No lesion.      Nails: There is no clubbing.   Neurological:      General: No focal deficit present.      Mental Status: She is alert and oriented to person, place, and time. Mental status is at baseline.      Cranial Nerves: Cranial nerves 2-12 are intact.      Sensory: Sensation is intact.      Motor: Motor function is intact.   Psychiatric:         Attention and Perception: Attention and perception normal.         Mood and Affect: Mood normal.         Speech: Speech normal.         Behavior: Behavior normal.         Thought Content: Thought content normal.         Cognition and Memory: Cognition and memory normal.         Judgment: Judgment normal.         Last Recorded Vitals  Vitals:    01/10/24 1220 01/10/24 1300 01/10/24 1400 01/10/24 1500   BP:  128/59 143/65 149/67   Pulse:  63 67 60   Resp:  21 (!) 35 12   Temp:       TempSrc:       SpO2: 96% 94% 93% 95%   Weight:       Height:            Intake/Output last 3 Shifts:  I/O last 3 completed shifts:  In: 1200 (14 mL/kg) [I.V.:200 (2.3 mL/kg); IV Piggyback:1000]  Out: 268 (3.1 mL/kg) [Urine:88 (0 mL/kg/hr); Blood:25; Chest Tube:155]  Weight: 85.6 kg     Scheduled medications  calcium carbonate-vitamin D3, 2  tablet, oral, Daily  cholecalciferol, 5,000 Units, oral, Daily  ciprofloxacin, 1 drop, Right Eye, BID  cyanocobalamin, 1,000 mcg, oral, Daily  enoxaparin, 40 mg, subcutaneous, Daily  fluticasone, 1 spray, Each Nostril, Daily  gabapentin, 300 mg, oral, BID  ipratropium-albuteroL, 3 mL, nebulization, 4x daily  magnesium oxide, 400 mg, oral, Daily  melatonin, 6 mg, oral, Nightly  metoprolol tartrate, 12.5 mg, oral, BID  pantoprazole, 40 mg, oral, BID  sennosides-docusate sodium, 2 tablet, oral, BID  simvastatin, 40 mg, oral, Nightly      Continuous medications     PRN medications  PRN medications: dextrose 10 % in water (D10W), dextrose, fentaNYL, glucagon, ipratropium-albuteroL, lubricating eye drops, oxyCODONE, oxyCODONE   Relevant Results  Labs  Results for orders placed or performed during the hospital encounter of 01/09/24 (from the past 24 hour(s))   Basic metabolic panel   Result Value Ref Range    Glucose 108 (H) 74 - 99 mg/dL    Sodium 138 136 - 145 mmol/L    Potassium 5.1 3.5 - 5.3 mmol/L    Chloride 105 98 - 107 mmol/L    Bicarbonate 28 21 - 32 mmol/L    Anion Gap 10 10 - 20 mmol/L    Urea Nitrogen 19 6 - 23 mg/dL    Creatinine 0.69 0.50 - 1.05 mg/dL    eGFR 87 >60 mL/min/1.73m*2    Calcium 9.6 8.6 - 10.3 mg/dL   CBC and Auto Differential   Result Value Ref Range    WBC 9.9 4.4 - 11.3 x10*3/uL    nRBC 0.0 0.0 - 0.0 /100 WBCs    RBC 4.34 4.00 - 5.20 x10*6/uL    Hemoglobin 12.4 12.0 - 16.0 g/dL    Hematocrit 40.4 36.0 - 46.0 %    MCV 93 80 - 100 fL    MCH 28.6 26.0 - 34.0 pg    MCHC 30.7 (L) 32.0 - 36.0 g/dL    RDW 13.2 11.5 - 14.5 %    Platelets 209 150 - 450 x10*3/uL    Neutrophils % 73.2 40.0 - 80.0 %    Immature Granulocytes %, Automated 0.3 0.0 - 0.9 %    Lymphocytes % 10.9 13.0 - 44.0 %    Monocytes % 15.2 2.0 - 10.0 %    Eosinophils % 0.2 0.0 - 6.0 %    Basophils % 0.2 0.0 - 2.0 %    Neutrophils Absolute 7.23 (H) 1.60 - 5.50 x10*3/uL    Immature Granulocytes Absolute, Automated 0.03 0.00 - 0.50 x10*3/uL     Lymphocytes Absolute 1.08 0.80 - 3.00 x10*3/uL    Monocytes Absolute 1.50 (H) 0.05 - 0.80 x10*3/uL    Eosinophils Absolute 0.02 0.00 - 0.40 x10*3/uL    Basophils Absolute 0.02 0.00 - 0.10 x10*3/uL   Magnesium   Result Value Ref Range    Magnesium 2.13 1.60 - 2.40 mg/dL              History of Present Illness: Heike Muñiz is a 83 y.o. 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      The patient was previously found to be with a blood clot, where she had underwent CT imaging of her chest; she was incidentally found to have  a L upper lung nodule. On 10/31/23, she underwent a CT guided biopsy, which indicated malignancy.      She was seen in consult by thoracic surgeon, Dr. Enrique Sorenson. It was felt that the patient was with a new diagnosis of stage I lung cancer. She was subsequently staged for a L VATS with wedge resection and possible lobectomy.     Daily Events    1/9/2024: Seen and assessed patient in the immediate post-op period. Emerging from OR anesthesia. Plan to recover in the inpatient setting.       - Current O2 Requirements: 4L NC     1/10/2024: No acute events overnight. Plan for chest tube removal today. Continue O2 weaning.     - Current O2 Requirements: 2L NC    Assessment & Plan       L Lung Nodule  - S/p L Robotic Upper Lobe Wedge Resection with Lobectomy on 1/09/24 with Thoracic Surgeon, Dr. Enrique Sorenson   - Plan for chest tube removal today  - Follow CXRs  - Follow up with pathology & hematology-oncology in the OP setting        Acute Respiratory Insufficiency   - As anticipated in the immediate post operative period   - Current O2 Requirements: 4L NC  - Pulm hygiene   - IS/Deep Breathing         Risk for Post-Operative Arrythmia   - Given  thoracic surgery, patient is at risk for development of atrial fibrilllation   - Plan to schedule on metoprolol tartrate 12.5mg BID  in the immediate post-op period and continue for 30 days  - Observe & replace electrolytes        Post Operative Pain   - Scheduled Acetaminophen 650 mg q6h  - PRN Oxycodone & fentanyl   - Can add magnesium oxide and gabapentin if pain is ongoing         Hyperlipidemia  - Home Medication: Simvastatin 40mg   - Resumed         Prophylaxis  - GI: Pantoprazole 40mg PO   - DVT: Enoxaparin, SCDs  - PT/OT Eval      Dispo   - CVICU     Above patient and paln discussed with thoracic surgeon, Dr. Enrique Sorenson.      I spent 55 minutes in the professional and overall care of this patient.      Claude Kim, APRN-CNP

## 2024-01-11 ENCOUNTER — APPOINTMENT (OUTPATIENT)
Dept: RADIOLOGY | Facility: HOSPITAL | Age: 84
DRG: 168 | End: 2024-01-11
Payer: MEDICARE

## 2024-01-11 VITALS
BODY MASS INDEX: 32.22 KG/M2 | SYSTOLIC BLOOD PRESSURE: 116 MMHG | OXYGEN SATURATION: 100 % | HEIGHT: 64 IN | WEIGHT: 188.71 LBS | DIASTOLIC BLOOD PRESSURE: 57 MMHG | TEMPERATURE: 98.4 F | HEART RATE: 63 BPM | RESPIRATION RATE: 21 BRPM

## 2024-01-11 LAB
ANION GAP SERPL CALC-SCNC: 9 MMOL/L (ref 10–20)
BASOPHILS # BLD AUTO: 0.02 X10*3/UL (ref 0–0.1)
BASOPHILS NFR BLD AUTO: 0.2 %
BUN SERPL-MCNC: 17 MG/DL (ref 6–23)
CALCIUM SERPL-MCNC: 8.9 MG/DL (ref 8.6–10.3)
CHLORIDE SERPL-SCNC: 104 MMOL/L (ref 98–107)
CO2 SERPL-SCNC: 28 MMOL/L (ref 21–32)
CREAT SERPL-MCNC: 0.54 MG/DL (ref 0.5–1.05)
EGFRCR SERPLBLD CKD-EPI 2021: >90 ML/MIN/1.73M*2
EOSINOPHIL # BLD AUTO: 0.25 X10*3/UL (ref 0–0.4)
EOSINOPHIL NFR BLD AUTO: 3.1 %
ERYTHROCYTE [DISTWIDTH] IN BLOOD BY AUTOMATED COUNT: 13.1 % (ref 11.5–14.5)
GLUCOSE SERPL-MCNC: 114 MG/DL (ref 74–99)
HCT VFR BLD AUTO: 37.2 % (ref 36–46)
HGB BLD-MCNC: 11.9 G/DL (ref 12–16)
IMM GRANULOCYTES # BLD AUTO: 0.03 X10*3/UL (ref 0–0.5)
IMM GRANULOCYTES NFR BLD AUTO: 0.4 % (ref 0–0.9)
LYMPHOCYTES # BLD AUTO: 0.89 X10*3/UL (ref 0.8–3)
LYMPHOCYTES NFR BLD AUTO: 10.9 %
MCH RBC QN AUTO: 29.1 PG (ref 26–34)
MCHC RBC AUTO-ENTMCNC: 32 G/DL (ref 32–36)
MCV RBC AUTO: 91 FL (ref 80–100)
MONOCYTES # BLD AUTO: 1.11 X10*3/UL (ref 0.05–0.8)
MONOCYTES NFR BLD AUTO: 13.6 %
NEUTROPHILS # BLD AUTO: 5.88 X10*3/UL (ref 1.6–5.5)
NEUTROPHILS NFR BLD AUTO: 71.8 %
NRBC BLD-RTO: 0 /100 WBCS (ref 0–0)
PLATELET # BLD AUTO: 164 X10*3/UL (ref 150–450)
POTASSIUM SERPL-SCNC: 4.5 MMOL/L (ref 3.5–5.3)
RBC # BLD AUTO: 4.09 X10*6/UL (ref 4–5.2)
SODIUM SERPL-SCNC: 136 MMOL/L (ref 136–145)
WBC # BLD AUTO: 8.2 X10*3/UL (ref 4.4–11.3)

## 2024-01-11 PROCEDURE — 2500000004 HC RX 250 GENERAL PHARMACY W/ HCPCS (ALT 636 FOR OP/ED): Performed by: NURSE PRACTITIONER

## 2024-01-11 PROCEDURE — 80048 BASIC METABOLIC PNL TOTAL CA: CPT

## 2024-01-11 PROCEDURE — 71045 X-RAY EXAM CHEST 1 VIEW: CPT | Performed by: RADIOLOGY

## 2024-01-11 PROCEDURE — 2500000002 HC RX 250 W HCPCS SELF ADMINISTERED DRUGS (ALT 637 FOR MEDICARE OP, ALT 636 FOR OP/ED): Performed by: NURSE PRACTITIONER

## 2024-01-11 PROCEDURE — 85025 COMPLETE CBC W/AUTO DIFF WBC: CPT

## 2024-01-11 PROCEDURE — 94640 AIRWAY INHALATION TREATMENT: CPT

## 2024-01-11 PROCEDURE — 71045 X-RAY EXAM CHEST 1 VIEW: CPT

## 2024-01-11 PROCEDURE — 99231 SBSQ HOSP IP/OBS SF/LOW 25: CPT | Performed by: NURSE PRACTITIONER

## 2024-01-11 PROCEDURE — 36415 COLL VENOUS BLD VENIPUNCTURE: CPT

## 2024-01-11 PROCEDURE — 2500000001 HC RX 250 WO HCPCS SELF ADMINISTERED DRUGS (ALT 637 FOR MEDICARE OP): Performed by: NURSE PRACTITIONER

## 2024-01-11 RX ORDER — OXYCODONE HYDROCHLORIDE 5 MG/1
5 TABLET ORAL EVERY 8 HOURS PRN
Qty: 21 TABLET | Refills: 0 | Status: SHIPPED | OUTPATIENT
Start: 2024-01-11 | End: 2024-01-24 | Stop reason: SDUPTHER

## 2024-01-11 RX ORDER — AMOXICILLIN 250 MG
2 CAPSULE ORAL 2 TIMES DAILY PRN
Qty: 40 TABLET | Refills: 0 | Status: SHIPPED | OUTPATIENT
Start: 2024-01-11 | End: 2024-01-21

## 2024-01-11 RX ORDER — METOPROLOL TARTRATE 25 MG/1
12.5 TABLET, FILM COATED ORAL 2 TIMES DAILY
Qty: 15 TABLET | Refills: 0 | Status: SHIPPED | OUTPATIENT
Start: 2024-01-11 | End: 2024-02-10

## 2024-01-11 RX ADMIN — METOPROLOL TARTRATE 12.5 MG: 25 TABLET, FILM COATED ORAL at 09:25

## 2024-01-11 RX ADMIN — CYANOCOBALAMIN TAB 1000 MCG 1000 MCG: 1000 TAB at 09:19

## 2024-01-11 RX ADMIN — CIPROFLOXACIN HYDROCHLORIDE 1 DROP: 3 SOLUTION/ DROPS OPHTHALMIC at 09:26

## 2024-01-11 RX ADMIN — OXYCODONE HYDROCHLORIDE 5 MG: 5 TABLET ORAL at 05:06

## 2024-01-11 RX ADMIN — Medication 2 TABLET: at 09:25

## 2024-01-11 RX ADMIN — CHOLECALCIFEROL TAB 25 MCG (1000 UNIT) 5000 UNITS: 25 TAB at 09:19

## 2024-01-11 RX ADMIN — ENOXAPARIN SODIUM 40 MG: 40 INJECTION SUBCUTANEOUS at 09:25

## 2024-01-11 RX ADMIN — GABAPENTIN 300 MG: 300 CAPSULE ORAL at 09:19

## 2024-01-11 RX ADMIN — IPRATROPIUM BROMIDE AND ALBUTEROL SULFATE 3 ML: .5; 3 SOLUTION RESPIRATORY (INHALATION) at 11:45

## 2024-01-11 RX ADMIN — SENNOSIDES AND DOCUSATE SODIUM 2 TABLET: 8.6; 5 TABLET ORAL at 09:19

## 2024-01-11 RX ADMIN — IPRATROPIUM BROMIDE AND ALBUTEROL SULFATE 3 ML: .5; 3 SOLUTION RESPIRATORY (INHALATION) at 06:53

## 2024-01-11 RX ADMIN — PANTOPRAZOLE SODIUM 40 MG: 40 TABLET, DELAYED RELEASE ORAL at 09:25

## 2024-01-11 RX ADMIN — MAGNESIUM OXIDE TAB 400 MG (241.3 MG ELEMENTAL MG) 400 MG: 400 (241.3 MG) TAB at 09:25

## 2024-01-11 ASSESSMENT — PAIN SCALES - GENERAL
PAINLEVEL_OUTOF10: 7
PAINLEVEL_OUTOF10: 0 - NO PAIN

## 2024-01-11 ASSESSMENT — PAIN - FUNCTIONAL ASSESSMENT: PAIN_FUNCTIONAL_ASSESSMENT: 0-10

## 2024-01-11 NOTE — PROGRESS NOTES
Subjective:  The patient is breathing without difficulty, saturating low 90s on RA. Her  is bedside and their questions were answered.     Objective:    Physical Exam  Constitutional: No respiratory acute distress, cooperative  Eyes: EOMI, clear sclera  ENMT: mucous membranes moist  Head/Neck: Neck supple, Trachea midline  Respiratory/Thorax: CTAB, no wheezes  Cardiovascular: RRR, distal pulses 2+, no edema  Gastrointestinal: non tender, no palpable masses  Musculoskeletal: ROM intact, no gross deformity  Neurological: No focal deficits, normal sensation  Psychological: Appropriate mood and behavior  Skin: Warm and dry, surgical incision sites well-healing     Last Recorded Vitals  /63   Pulse 63   Temp 36.9 °C (98.4 °F) (Temporal)   Resp 19   Wt 85.6 kg (188 lb 11.4 oz)   SpO2 97%     Relevant Results  Scheduled medications  calcium carbonate-vitamin D3, 2 tablet, oral, Daily  cholecalciferol, 5,000 Units, oral, Daily  ciprofloxacin, 1 drop, Right Eye, BID  cyanocobalamin, 1,000 mcg, oral, Daily  enoxaparin, 40 mg, subcutaneous, Daily  fluticasone, 1 spray, Each Nostril, Daily  gabapentin, 300 mg, oral, BID  ipratropium-albuteroL, 3 mL, nebulization, 4x daily  magnesium oxide, 400 mg, oral, Daily  melatonin, 6 mg, oral, Nightly  metoprolol tartrate, 12.5 mg, oral, BID  pantoprazole, 40 mg, oral, BID  sennosides-docusate sodium, 2 tablet, oral, BID  simvastatin, 40 mg, oral, Nightly        Continuous medications     PRN medications  PRN medications: dextrose 10 % in water (D10W), dextrose, fentaNYL, glucagon, ipratropium-albuteroL, lubricating eye drops, oxyCODONE, oxyCODONE  Results for orders placed or performed during the hospital encounter of 01/09/24 (from the past 24 hour(s))   CBC and Auto Differential   Result Value Ref Range    WBC 8.2 4.4 - 11.3 x10*3/uL    nRBC 0.0 0.0 - 0.0 /100 WBCs    RBC 4.09 4.00 - 5.20 x10*6/uL    Hemoglobin 11.9 (L) 12.0 - 16.0 g/dL    Hematocrit 37.2 36.0 - 46.0  %    MCV 91 80 - 100 fL    MCH 29.1 26.0 - 34.0 pg    MCHC 32.0 32.0 - 36.0 g/dL    RDW 13.1 11.5 - 14.5 %    Platelets 164 150 - 450 x10*3/uL    Neutrophils % 71.8 40.0 - 80.0 %    Immature Granulocytes %, Automated 0.4 0.0 - 0.9 %    Lymphocytes % 10.9 13.0 - 44.0 %    Monocytes % 13.6 2.0 - 10.0 %    Eosinophils % 3.1 0.0 - 6.0 %    Basophils % 0.2 0.0 - 2.0 %    Neutrophils Absolute 5.88 (H) 1.60 - 5.50 x10*3/uL    Immature Granulocytes Absolute, Automated 0.03 0.00 - 0.50 x10*3/uL    Lymphocytes Absolute 0.89 0.80 - 3.00 x10*3/uL    Monocytes Absolute 1.11 (H) 0.05 - 0.80 x10*3/uL    Eosinophils Absolute 0.25 0.00 - 0.40 x10*3/uL    Basophils Absolute 0.02 0.00 - 0.10 x10*3/uL   Basic Metabolic Panel   Result Value Ref Range    Glucose 114 (H) 74 - 99 mg/dL    Sodium 136 136 - 145 mmol/L    Potassium 4.5 3.5 - 5.3 mmol/L    Chloride 104 98 - 107 mmol/L    Bicarbonate 28 21 - 32 mmol/L    Anion Gap 9 (L) 10 - 20 mmol/L    Urea Nitrogen 17 6 - 23 mg/dL    Creatinine 0.54 0.50 - 1.05 mg/dL    eGFR >90 >60 mL/min/1.73m*2    Calcium 8.9 8.6 - 10.3 mg/dL     [unfilled]      Assessment/Plan     Daily Progress:  1/10: The patient is HDS. She is on several liters nasal cannula. Will need OP follow up of pathological findings. Oliguria, bladder scans ordered, recommend PRN fluid boluses if needed.     1/11: Discussed the case with CT surgery team.  Hope to wean on to room air and discharge after removal of chest tube. CXR reviewed    Assessment & Plan:  eHike Muñiz is a 83 y.o. female with a past medical history of mesenteric thrombus, meningioma, lung nodule, former nicotine use disorder presenting for VATS and possible  NIC lobectomy 1/9.     Neurological System:  Chronic neuropathy  History of meningioma status postresection  -Baseline mentation AO x3, will reassess   -Avoid excessive caths/ lines, monitor for ICU delirium  -Continue home gabapentin as needed oxycodone and fentanyl     Cardiovascular System:  -Monitor  hemodynamics closely to maintain MAP >65  -Hold home antihypertensive while in the ICU  -Continue home statin  -Will give metoprolol at reduced dose per CT surgery team recommendations     Respiratory System:  Left upper lobe lung nodule-concerning for malignancy, s/p Left upper lobe wedge video-assisted thoracoscopy, possible lobectomy with lymphatic node dissection   Acute post-op resp insufficiency  -Oxygen as needed, currently on several liters nasal cannula  -PRN breathing treatments     Gastrointestinal System:  GERD  -Continue PPI daily     Endocrine System:  -Accu-checks, SSI, hypoglycemia     Renal/ System:  Oliguria - improving  -Trend BMP, monitor UOP, optimize electrolytes   -PRN bladder scan, PRN fluid boluses     Hematological System:  History of thrombus of the superior mesenteric vein, on Eliquis  -On Eliquis 2.5 twice daily.  Follows with heme-onc.  Thrombus occurred 2021. Restart eliquis per CT surg recs.  -monitor CBC     Infection Disease System:  -Monitor for signs of infection     Vent/O2: nasal cannula  Lines/Devices: Peripheral Iv  Drips/Fluids: As needed fluid boluses  DVT PPX: SCDs, lovenox  Code: Full code     Ani Boland M.D. PGY-2

## 2024-01-11 NOTE — DISCHARGE SUMMARY
Discharge Diagnosis  Primary cancer of left upper lobe of lung (CMS/HCC)    Issues Requiring Follow-Up  Follow up with Dr. Sorenson in the office with a CXR prior to appt    Test Results Pending At Discharge  Pending Labs       Order Current Status    Surgical Pathology Exam In process            Hospital Course   History of Present Illness: Heike Muñiz is a 83 y.o. 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      The patient was previously found to be with a blood clot, where she had underwent CT imaging of her chest; she was incidentally found to have  a L upper lung nodule. On 10/31/23, she underwent a CT guided biopsy, which indicated malignancy.      She was seen in consult by thoracic surgeon, Dr. Enrique Sorenson. It was felt that the patient was with a new diagnosis of stage I lung cancer. She was subsequently staged for a L VATS with wedge resection and possible lobectomy.      Daily Events     1/9/2024: Seen and assessed patient in the immediate post-op period. Emerging from OR anesthesia. Plan to recover in the inpatient setting.         - Current O2 Requirements: 4L NC     1/10/2024: No acute events overnight. Plan for chest tube removal today. Continue O2 weaning.      - Current O2 Requirements: 2L NC    1/11/2024: On room air this am 92-94%. Minimal pain. Ambulating. Denies any fevers, chills, diaphoresis, chest pain, palpitations, shortness of breath, back pain, abdominal pain, nausea, vomiting, headache, numbness or tingling. Good appetite. Passing flatus.  at bedside. DC home today     Assessment & Plan        L Lung Nodule  - S/p L Robotic Upper Lobe Wedge Resection with Lobectomy on 1/09/24 with Thoracic Surgeon, Dr. Enrique Sorenson   - post-op  CXR prior to office  - Follow up with pathology & hematology-oncology in the OP setting        Risk for Post-Operative Arrythmia   - Given thoracic surgery, patient is at risk for development of atrial fibrilllation   - Plan to schedule on metoprolol tartrate 12.5mg BID  in the immediate post-op period and continue for 30 days        Post Operative Pain   - PRN Oxycodone for severe pain, rx sent, bowel regimen while on narcotics        Hyperlipidemia  - Home Medication: Simvastatin 40mg   - Resumed     Dispo   - to home today     Above patient and plan discussed with thoracic surgeon, Dr. Enrique Sorenson.     Pertinent Physical Exam At Time of Discharge  Physical Exam  Vitals reviewed.   Constitutional:       Appearance: Normal appearance.   HENT:      Head: Normocephalic and atraumatic.      Nose: Nose normal.      Mouth/Throat:      Mouth: Mucous membranes are moist.   Eyes:      Conjunctiva/sclera: Conjunctivae normal.   Cardiovascular:      Rate and Rhythm: Normal rate and regular rhythm.      Pulses: Normal pulses.      Heart sounds: Normal heart sounds.   Pulmonary:      Effort: Pulmonary effort is normal.      Breath sounds: Normal breath sounds.   Abdominal:      General: Bowel sounds are normal.      Palpations: Abdomen is soft.   Musculoskeletal:         General: Normal range of motion.      Cervical back: Normal range of motion and neck supple.   Skin:     General: Skin is warm and dry.      Comments: Surgical incisions without drainage, minimal erythema   Neurological:      General: No focal deficit present.      Mental Status: She is alert.   Psychiatric:         Mood and Affect: Mood normal.         Speech: Speech normal.         Behavior: Behavior normal.         Home Medications     Medication List      START taking these medications     metoprolol tartrate 25 mg tablet; Commonly known as: Lopressor; Take 0.5   tablets (12.5 mg) by mouth 2 times a day.   oxyCODONE 5 mg immediate release tablet;  Commonly known as: Roxicodone;   Take 1 tablet (5 mg) by mouth every 8 hours if needed for severe pain (7 -   10) for up to 7 days.   sennosides-docusate sodium 8.6-50 mg tablet; Commonly known as:   Lori-Colace; Take 2 tablets by mouth 2 times a day as needed for   constipation for up to 10 days.     CONTINUE taking these medications     CALCIUM 600 + D(3) ORAL   ciprofloxacin 0.3 % ophthalmic solution; Commonly known as: Ciloxan;   Administer 1 drop into the right eye 2 times a day. Apply to affected left   finger nail   Eliquis 2.5 mg tablet; Generic drug: apixaban   Essential Woman 50 Plus 0.4-250 mg-mcg tablet; Generic drug:   mv-min-folic acid-lutein   Flonase Allergy Relief 50 mcg/actuation nasal spray; Generic drug:   fluticasone   gabapentin 300 mg capsule; Commonly known as: Neurontin   melatonin 3 mg tablet   pantoprazole 40 mg EC tablet; Commonly known as: ProtoNix   simvastatin 40 mg tablet; Commonly known as: Zocor   TURMERIC ORAL   Vitamin B-12 1,000 mcg tablet; Generic drug: cyanocobalamin   Vitamin D3 5,000 Units tablet; Generic drug: cholecalciferol       Outpatient Follow-Up  Future Appointments   Date Time Provider Department Center   1/24/2024 11:00 AM Enrique Sorenson MD [id number]       ZACHARIAH House-MANUEL, DNP

## 2024-01-12 LAB
BLOOD EXPIRATION DATE: NORMAL
BLOOD EXPIRATION DATE: NORMAL
DISPENSE STATUS: NORMAL
DISPENSE STATUS: NORMAL
PRODUCT BLOOD TYPE: 6200
PRODUCT BLOOD TYPE: 6200
PRODUCT CODE: NORMAL
PRODUCT CODE: NORMAL
UNIT ABO: NORMAL
UNIT ABO: NORMAL
UNIT NUMBER: NORMAL
UNIT NUMBER: NORMAL
UNIT RH: NORMAL
UNIT RH: NORMAL
UNIT VOLUME: 350
UNIT VOLUME: 350
XM INTEP: NORMAL
XM INTEP: NORMAL

## 2024-01-17 LAB
LAB AP ASR DISCLAIMER: NORMAL
LABORATORY COMMENT REPORT: NORMAL
Lab: NORMAL
PATH REPORT.COMMENTS IMP SPEC: NORMAL
PATH REPORT.FINAL DX SPEC: NORMAL
PATH REPORT.GROSS SPEC: NORMAL
PATH REPORT.RELEVANT HX SPEC: NORMAL
PATH REPORT.TOTAL CANCER: NORMAL
PATHOLOGY SYNOPTIC REPORT: NORMAL

## 2024-01-19 LAB
ATRIAL RATE: 64 BPM
ELECTRONICALLY SIGNED BY: NORMAL
FOCUSED SOLID TUMOR DNA/RNA RESULTS: NORMAL
P AXIS: 41 DEGREES
P OFFSET: 186 MS
P ONSET: 139 MS
PR INTERVAL: 172 MS
Q ONSET: 225 MS
QRS COUNT: 11 BEATS
QRS DURATION: 86 MS
QT INTERVAL: 392 MS
QTC CALCULATION(BAZETT): 404 MS
QTC FREDERICIA: 400 MS
R AXIS: -21 DEGREES
T AXIS: 45 DEGREES
T OFFSET: 421 MS
VENTRICULAR RATE: 64 BPM

## 2024-01-22 PROBLEM — R10.9 ABDOMINAL PAIN: Status: RESOLVED | Noted: 2024-01-22 | Resolved: 2024-01-22

## 2024-01-22 PROBLEM — M47.27 OTHER SPONDYLOSIS WITH RADICULOPATHY, LUMBOSACRAL REGION: Status: RESOLVED | Noted: 2022-11-16 | Resolved: 2024-01-22

## 2024-01-22 PROBLEM — K83.1 COMMON BILE DUCT OBSTRUCTION (CMS-HCC): Status: RESOLVED | Noted: 2022-11-16 | Resolved: 2024-01-22

## 2024-01-22 PROBLEM — R11.2 NAUSEA AND/OR VOMITING: Status: RESOLVED | Noted: 2024-01-22 | Resolved: 2024-01-22

## 2024-01-22 PROBLEM — Z86.718 PERSONAL HISTORY OF OTHER VENOUS THROMBOSIS AND EMBOLISM: Status: RESOLVED | Noted: 2022-11-16 | Resolved: 2024-01-22

## 2024-01-22 PROBLEM — J01.90 ACUTE SINUSITIS: Status: RESOLVED | Noted: 2024-01-22 | Resolved: 2024-01-22

## 2024-01-22 PROBLEM — R07.9 CHEST PAIN, UNSPECIFIED: Status: RESOLVED | Noted: 2022-11-16 | Resolved: 2024-01-22

## 2024-01-22 PROBLEM — M54.16 LUMBAR RADICULOPATHY: Status: RESOLVED | Noted: 2022-11-16 | Resolved: 2024-01-22

## 2024-01-22 PROBLEM — K21.9 GASTROESOPHAGEAL REFLUX DISEASE: Status: RESOLVED | Noted: 2024-01-22 | Resolved: 2024-01-22

## 2024-01-22 PROBLEM — M70.61 TROCHANTERIC BURSITIS, RIGHT HIP: Status: RESOLVED | Noted: 2022-11-16 | Resolved: 2024-01-22

## 2024-01-22 PROBLEM — Z86.61 HISTORY OF MENINGITIS: Status: RESOLVED | Noted: 2024-01-22 | Resolved: 2024-01-22

## 2024-01-22 PROBLEM — H26.9 UNSPECIFIED CATARACT: Status: RESOLVED | Noted: 2022-11-16 | Resolved: 2024-01-22

## 2024-01-22 PROBLEM — M48.061 LUMBAR SPINAL STENOSIS: Status: RESOLVED | Noted: 2022-11-16 | Resolved: 2024-01-22

## 2024-01-22 PROBLEM — R01.1 CARDIAC MURMUR, UNSPECIFIED: Status: RESOLVED | Noted: 2022-11-16 | Resolved: 2024-01-22

## 2024-01-22 PROBLEM — R91.1 LUNG NODULE: Status: RESOLVED | Noted: 2024-01-22 | Resolved: 2024-01-22

## 2024-01-22 PROBLEM — M81.0 OSTEOPOROSIS: Status: RESOLVED | Noted: 2024-01-22 | Resolved: 2024-01-22

## 2024-01-22 PROBLEM — Q78.2 BONY SCLEROSIS (HHS-HCC): Status: ACTIVE | Noted: 2024-01-22

## 2024-01-22 PROBLEM — M25.559 ARTHRALGIA OF HIP: Status: RESOLVED | Noted: 2024-01-22 | Resolved: 2024-01-22

## 2024-01-22 PROBLEM — T14.8XXA HEMATOMA: Status: RESOLVED | Noted: 2022-08-09 | Resolved: 2024-01-22

## 2024-01-22 PROBLEM — R35.1 NOCTURIA: Status: RESOLVED | Noted: 2024-01-22 | Resolved: 2024-01-22

## 2024-01-22 PROBLEM — D64.9 ANEMIA, UNSPECIFIED: Status: RESOLVED | Noted: 2022-11-16 | Resolved: 2024-01-22

## 2024-01-22 PROBLEM — M54.50 LOW BACK PAIN: Status: RESOLVED | Noted: 2024-01-22 | Resolved: 2024-01-22

## 2024-01-22 PROBLEM — M43.10 SPONDYLOLISTHESIS, SITE UNSPECIFIED: Status: RESOLVED | Noted: 2022-11-16 | Resolved: 2024-01-22

## 2024-01-22 PROBLEM — S42.292D: Status: RESOLVED | Noted: 2022-11-16 | Resolved: 2024-01-22

## 2024-01-22 PROBLEM — K63.5 POLYP OF COLON: Status: RESOLVED | Noted: 2022-11-16 | Resolved: 2024-01-22

## 2024-01-22 PROBLEM — K57.92 DIVERTICULITIS: Status: RESOLVED | Noted: 2024-01-22 | Resolved: 2024-01-22

## 2024-01-22 PROBLEM — R29.6 REPEATED FALLS: Status: RESOLVED | Noted: 2022-11-16 | Resolved: 2024-01-22

## 2024-01-22 PROBLEM — K55.069 SUPERIOR MESENTERIC VEIN THROMBOSIS (MULTI): Status: RESOLVED | Noted: 2023-11-22 | Resolved: 2024-01-22

## 2024-01-22 PROBLEM — L30.4 INTERTRIGO: Status: RESOLVED | Noted: 2024-01-22 | Resolved: 2024-01-22

## 2024-01-22 PROBLEM — M70.60 GREATER TROCHANTERIC BURSITIS: Status: RESOLVED | Noted: 2024-01-22 | Resolved: 2024-01-22

## 2024-01-22 PROBLEM — A49.8 PSEUDOMONAS AERUGINOSA INFECTION: Status: RESOLVED | Noted: 2024-01-22 | Resolved: 2024-01-22

## 2024-01-22 PROBLEM — D21.9 BENIGN NEOPLASM OF SOFT TISSUE: Status: RESOLVED | Noted: 2024-01-22 | Resolved: 2024-01-22

## 2024-01-22 PROBLEM — R26.2 DIFFICULTY IN WALKING, NOT ELSEWHERE CLASSIFIED: Status: RESOLVED | Noted: 2022-11-16 | Resolved: 2024-01-22

## 2024-01-22 PROBLEM — M43.10 ACQUIRED SPONDYLOLISTHESIS: Status: RESOLVED | Noted: 2024-01-22 | Resolved: 2024-01-22

## 2024-01-22 PROBLEM — L82.1 OTHER SEBORRHEIC KERATOSIS: Status: RESOLVED | Noted: 2022-11-10 | Resolved: 2024-01-22

## 2024-01-22 PROBLEM — R26.2 DISABILITY OF WALKING: Status: RESOLVED | Noted: 2022-11-16 | Resolved: 2024-01-22

## 2024-01-22 PROBLEM — Z86.39 HISTORY OF ENDOCRINE DISORDER: Status: RESOLVED | Noted: 2024-01-22 | Resolved: 2024-01-22

## 2024-01-22 PROBLEM — D22.5 MELANOCYTIC NEVI OF TRUNK: Status: RESOLVED | Noted: 2022-11-10 | Resolved: 2024-01-22

## 2024-01-22 PROBLEM — M62.81 MUSCLE WEAKNESS (GENERALIZED): Status: RESOLVED | Noted: 2022-11-16 | Resolved: 2024-01-22

## 2024-01-22 PROBLEM — K59.09 CHRONIC CONSTIPATION: Status: RESOLVED | Noted: 2024-01-22 | Resolved: 2024-01-22

## 2024-01-22 PROBLEM — D22.39 MELANOCYTIC NEVI OF OTHER PARTS OF FACE: Status: RESOLVED | Noted: 2022-11-10 | Resolved: 2024-01-22

## 2024-01-22 PROBLEM — E78.00 PURE HYPERCHOLESTEROLEMIA: Status: RESOLVED | Noted: 2024-01-22 | Resolved: 2024-01-22

## 2024-01-22 PROBLEM — E66.9 OBESITY, UNSPECIFIED: Status: RESOLVED | Noted: 2022-11-16 | Resolved: 2024-01-22

## 2024-01-22 PROBLEM — E16.2 HYPOGLYCEMIA: Status: RESOLVED | Noted: 2024-01-22 | Resolved: 2024-01-22
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
## 2024-01-22 PROBLEM — M81.0 AGE-RELATED OSTEOPOROSIS WITHOUT CURRENT PATHOLOGICAL FRACTURE: Status: RESOLVED | Noted: 2022-11-16 | Resolved: 2024-01-22

## 2024-01-22 PROBLEM — Z86.69 HISTORY OF BRAIN DISORDER: Status: RESOLVED | Noted: 2024-01-22 | Resolved: 2024-01-22

## 2024-01-22 PROBLEM — M46.1 INFLAMMATION OF SACROILIAC JOINT (CMS-HCC): Status: RESOLVED | Noted: 2024-01-22 | Resolved: 2024-01-22

## 2024-01-22 PROBLEM — L60.9 NAIL DISORDER, UNSPECIFIED: Status: RESOLVED | Noted: 2022-11-10 | Resolved: 2024-01-22

## 2024-01-22 PROBLEM — R91.1 SOLITARY PULMONARY NODULE: Status: RESOLVED | Noted: 2024-01-22 | Resolved: 2024-01-22

## 2024-01-22 PROBLEM — K83.1 OBSTRUCTION OF BILE DUCT (CMS-HCC): Status: RESOLVED | Noted: 2022-11-16 | Resolved: 2024-01-22

## 2024-01-22 PROBLEM — Q78.2 BONY SCLEROSIS (HHS-HCC): Status: RESOLVED | Noted: 2024-01-22 | Resolved: 2024-01-22

## 2024-01-22 PROBLEM — R93.89 ABNORMAL CHEST CT: Status: RESOLVED | Noted: 2024-01-22 | Resolved: 2024-01-22

## 2024-01-22 PROBLEM — L81.4 LENTIGINOSIS: Status: RESOLVED | Noted: 2024-01-22 | Resolved: 2024-01-22

## 2024-01-22 PROBLEM — L82.1 SEBORRHEIC KERATOSIS: Status: RESOLVED | Noted: 2024-01-22 | Resolved: 2024-01-22

## 2024-01-22 PROBLEM — K27.9 PEPTIC ULCER DISEASE: Status: RESOLVED | Noted: 2024-01-22 | Resolved: 2024-01-22

## 2024-01-22 PROBLEM — C34.92: Status: RESOLVED | Noted: 2023-11-22 | Resolved: 2024-01-22

## 2024-01-22 PROBLEM — L24.9 IRRITANT CONTACT DERMATITIS: Status: RESOLVED | Noted: 2024-01-22 | Resolved: 2024-01-22

## 2024-01-22 PROBLEM — D64.9 ANEMIA: Status: RESOLVED | Noted: 2024-01-22 | Resolved: 2024-01-22

## 2024-01-22 PROBLEM — K21.9 GASTRO-ESOPHAGEAL REFLUX DISEASE WITHOUT ESOPHAGITIS: Status: RESOLVED | Noted: 2022-11-16 | Resolved: 2024-01-22

## 2024-01-22 PROBLEM — K22.9 ESOPHAGEAL DISORDER: Status: RESOLVED | Noted: 2022-11-16 | Resolved: 2024-01-22

## 2024-01-22 PROBLEM — L81.4 OTHER MELANIN HYPERPIGMENTATION: Status: RESOLVED | Noted: 2022-11-10 | Resolved: 2024-01-22

## 2024-01-22 PROBLEM — G96.198 PSEUDOMENINGOCELE: Status: RESOLVED | Noted: 2024-01-22 | Resolved: 2024-01-22

## 2024-01-22 PROBLEM — R33.8 ACUTE URINARY RETENTION: Status: RESOLVED | Noted: 2024-01-22 | Resolved: 2024-01-22

## 2024-01-22 PROBLEM — I82.90 VENOUS THROMBOSIS: Status: RESOLVED | Noted: 2024-01-22 | Resolved: 2024-01-22

## 2024-01-22 PROBLEM — R01.1 HEART MURMUR: Status: RESOLVED | Noted: 2022-11-16 | Resolved: 2024-01-22

## 2024-01-22 PROBLEM — D18.01 HEMANGIOMA OF SKIN AND SUBCUTANEOUS TISSUE: Status: RESOLVED | Noted: 2022-11-10 | Resolved: 2024-01-22

## 2024-01-22 PROBLEM — R26.81 GAIT INSTABILITY: Status: RESOLVED | Noted: 2023-04-14 | Resolved: 2024-01-22

## 2024-01-22 PROBLEM — M79.89 SOFT TISSUE MASS: Status: RESOLVED | Noted: 2024-01-22 | Resolved: 2024-01-22

## 2024-01-22 PROBLEM — D48.5 NEOPLASM OF UNCERTAIN BEHAVIOR OF SKIN: Status: RESOLVED | Noted: 2022-11-10 | Resolved: 2024-01-22

## 2024-01-22 PROBLEM — M47.817 LUMBOSACRAL SPONDYLOSIS: Status: RESOLVED | Noted: 2023-04-14 | Resolved: 2024-01-22

## 2024-01-22 RX ORDER — CARBAMAZEPINE 200 MG/1
TABLET ORAL
COMMUNITY

## 2024-01-22 NOTE — PROGRESS NOTES
"Lina Muñiz  is a 83 y.o. female who presents for evaluation of left lung cancer status post left upper lobe wedge with completion lobectomy on  1/9/24 (adenocarcinoma pT2aN0).       This started with a blood clot, which led to a scan and an incidental nodule. She then received a CT scan of the chest on 10/5/2023 which showed a 1.9 x 2.3 cm mass in the left upper lobe.  This included a solid component measuring 8 mm in diameter.  The solid component measuring 5 mm on the previous CT scan.  She was referred for a CT-guided lung biopsy on 10/31/2023, which showed lung cancer.  She also underwent a PET/CT on 11/13/2023, which showed no evidence of metastatic disease.  The patient received a thoracoscopic lingular segmental resection on 1/9/2024.  She was discharged from the hospital on 1/11/2024.    Currently the patient is presenting for post-operative evaluation.  She feels \"sore\". They report the following symptoms: shortness of breath with activity and deny the following symptoms: shortness of breath at rest, cough, hemoptysis, fevers, chills, weight loss, and wound complications.      There have been no significant changes to their documented medical, surgical and family history.     She  reports that she quit smoking about 20 years ago. Her smoking use included cigarettes. She has never used smokeless tobacco. She reports current alcohol use. She reports that she does not use drugs.    Objective   Physical Exam  The patient is well-appearing and in no acute distress. The trachea is midline and there is no crepitus. The lungs were clear to auscultation, there was no dullness to percussion, no fremitus or egophony. There was good effort and excursion. The heart had a regular rate and rhythm. The abdomen was soft, nontender and nondistended. The extremities had no edema. Surgical incisions are healing well.  Diagnostic Studies  I reviewed a post-operative CXR which shows no significant pleural effusion " or pneumothorax  I have reviewed a pathology result which show a pT2a N0 adenocarcinoma measuring 1.5 cm in size.  Resection margins are free of tumor.  No evidence of lymph node metastasis    Assessment/Plan   Overall, I believe that the patient is recovering appropriately from their recent surgery.     The patient is recently status post surgical intervention.  I believe they are recovering appropriately.  Their clinical and/or radiographic presentation suggest no evidence of postoperative complications.    In regards to the patient's lung cancer, she has a new diagnosis of early stage lung cancer.  I do not believe the patient requires additional treatment with chemotherapy or radiation.  I believe radiographic surveillance is appropriate.  I recommend she receive CT scans of the chest every 6 months for 2 years and yearly after this    I recommend CT chest in 6 months.     I discussed this in detail with the patient, including a discussion of alternatives. They were comfortable with this approach.     Enrique Sorenson MD  [phone]

## 2024-01-24 ENCOUNTER — HOSPITAL ENCOUNTER (OUTPATIENT)
Dept: RADIOLOGY | Facility: CLINIC | Age: 84
Discharge: HOME | End: 2024-01-24
Payer: COMMERCIAL

## 2024-01-24 ENCOUNTER — OFFICE VISIT (OUTPATIENT)
Dept: SURGERY | Facility: CLINIC | Age: 84
End: 2024-01-24
Payer: COMMERCIAL

## 2024-01-24 VITALS
HEART RATE: 55 BPM | SYSTOLIC BLOOD PRESSURE: 130 MMHG | RESPIRATION RATE: 18 BRPM | OXYGEN SATURATION: 95 % | DIASTOLIC BLOOD PRESSURE: 63 MMHG | TEMPERATURE: 98 F

## 2024-01-24 DIAGNOSIS — C34.90 MALIGNANT NEOPLASM OF LUNG, UNSPECIFIED LATERALITY, UNSPECIFIED PART OF LUNG (MULTI): ICD-10-CM

## 2024-01-24 DIAGNOSIS — C34.12 PRIMARY CANCER OF LEFT UPPER LOBE OF LUNG (MULTI): ICD-10-CM

## 2024-01-24 PROCEDURE — 1159F MED LIST DOCD IN RCRD: CPT | Performed by: THORACIC SURGERY (CARDIOTHORACIC VASCULAR SURGERY)

## 2024-01-24 PROCEDURE — 99024 POSTOP FOLLOW-UP VISIT: CPT | Performed by: THORACIC SURGERY (CARDIOTHORACIC VASCULAR SURGERY)

## 2024-01-24 PROCEDURE — 1125F AMNT PAIN NOTED PAIN PRSNT: CPT | Performed by: THORACIC SURGERY (CARDIOTHORACIC VASCULAR SURGERY)

## 2024-01-24 PROCEDURE — 71046 X-RAY EXAM CHEST 2 VIEWS: CPT | Performed by: RADIOLOGY

## 2024-01-24 PROCEDURE — 71046 X-RAY EXAM CHEST 2 VIEWS: CPT

## 2024-01-24 PROCEDURE — 1036F TOBACCO NON-USER: CPT | Performed by: THORACIC SURGERY (CARDIOTHORACIC VASCULAR SURGERY)

## 2024-01-24 PROCEDURE — 1111F DSCHRG MED/CURRENT MED MERGE: CPT | Performed by: THORACIC SURGERY (CARDIOTHORACIC VASCULAR SURGERY)

## 2024-01-24 RX ORDER — OXYCODONE HYDROCHLORIDE 5 MG/1
5 TABLET ORAL EVERY 8 HOURS PRN
Qty: 15 TABLET | Refills: 0 | Status: SHIPPED | OUTPATIENT
Start: 2024-01-24 | End: 2024-01-31

## 2024-01-24 ASSESSMENT — ENCOUNTER SYMPTOMS
OCCASIONAL FEELINGS OF UNSTEADINESS: 0
DEPRESSION: 0
LOSS OF SENSATION IN FEET: 0

## 2024-01-24 ASSESSMENT — PAIN SCALES - GENERAL: PAINLEVEL: 7

## 2024-01-25 DIAGNOSIS — C34.12 MALIGNANT NEOPLASM OF UPPER LOBE OF LEFT LUNG (MULTI): Primary | ICD-10-CM

## 2024-02-07 ENCOUNTER — TUMOR BOARD CONFERENCE (OUTPATIENT)
Dept: HEMATOLOGY/ONCOLOGY | Facility: HOSPITAL | Age: 84
End: 2024-02-07
Payer: COMMERCIAL

## 2024-02-08 NOTE — TUMOR BOARD NOTE
Tumor Board Documentation    Heike Muñiz was presented by Dr. Sorenson at our Tumor Board on 2/7/2024, which included representatives from Medical Oncology, Radiation Oncology, Surgical Oncology, Radiology, and Pathology.    Heike currently presents as Current patient with history of the following treatments: Left Upper Lobe Wedge Resection for Adenocarcinoma.      Additionally, we reviewed previous medical and familial history, history of present illness, and recent lab results along with all available histopathologic and imaging studies. The tumor board considered available treatment options and made the following recommendations:  Observation, surveillance    The following procedures/referrals were also placed: Referred back to Surgical Oncology.      Clinical Trial Status: Not discussed     National site-specific guidelines were discussed with respect to the case.    pT2a, pN0, cM0 - Stage IB

## 2024-02-12 NOTE — PROGRESS NOTES
Subjective   Heike Muñiz  is a 83 y.o. female who presents for evaluation of surgical incision that started oozing.    She has left lung cancer status post left upper lobe wedge with completion lobectomy on 1/9/24 (adenocarcinoma pT2aN0).   This started with a blood clot, which led to a scan and an incidental nodule. She then received a CT scan of the chest on 10/5/2023 which showed a 1.9 x 2.3 cm mass in the left upper lobe.  This included a solid component measuring 8 mm in diameter.  The solid component measuring 5 mm on the previous CT scan.  She was referred for a CT-guided lung biopsy on 10/31/2023, which showed lung cancer.  She also underwent a PET/CT on 11/13/2023, which showed no evidence of metastatic disease.  The patient received a thoracoscopic lingular segmental resection on 1/9/2024.  She was discharged from the hospital on 1/11/2024.     Currently the patient is concerned about drainage of fluid from her wound . She deny the following symptoms: fevers and chills.      She  reports that she quit smoking about 20 years ago. Her smoking use included cigarettes. She has never used smokeless tobacco. She reports current alcohol use. She reports that she does not use drugs.    Objective   Physical Exam  The patient is well-appearing and in no acute distress. Surgical incisions are healing well. There is scant serous dreainage from the posterior wound.     Assessment/Plan   Overall, I believe that the patient is doing well. This is likely fat necrosis or a wound granuloma    I discussed this in detail with the patient, including a discussion of alternatives. They were comfortable with this approach.     Enrique Sorenson MD  [phone]

## 2024-02-13 PROBLEM — Q78.2 OSTEOPETROSIS (HHS-HCC): Status: RESOLVED | Noted: 2024-01-22 | Resolved: 2024-02-13

## 2024-02-13 RX ORDER — IPRATROPIUM BROMIDE AND ALBUTEROL SULFATE 2.5; .5 MG/3ML; MG/3ML
3 SOLUTION RESPIRATORY (INHALATION) 4 TIMES DAILY PRN
COMMUNITY
Start: 2024-01-09 | End: 2024-02-14 | Stop reason: ALTCHOICE

## 2024-02-13 RX ORDER — LANOLIN ALCOHOL/MO/W.PET/CERES
400 CREAM (GRAM) TOPICAL
COMMUNITY
Start: 2024-01-09 | End: 2024-02-14 | Stop reason: ALTCHOICE

## 2024-02-14 ENCOUNTER — OFFICE VISIT (OUTPATIENT)
Dept: SURGERY | Facility: CLINIC | Age: 84
End: 2024-02-14
Payer: COMMERCIAL

## 2024-02-14 VITALS
TEMPERATURE: 98.2 F | HEIGHT: 63 IN | WEIGHT: 171 LBS | OXYGEN SATURATION: 97 % | SYSTOLIC BLOOD PRESSURE: 99 MMHG | DIASTOLIC BLOOD PRESSURE: 57 MMHG | HEART RATE: 71 BPM | BODY MASS INDEX: 30.3 KG/M2 | RESPIRATION RATE: 18 BRPM

## 2024-02-14 DIAGNOSIS — L24.A9 WOUND DRAINAGE: Primary | ICD-10-CM

## 2024-02-14 PROCEDURE — 1125F AMNT PAIN NOTED PAIN PRSNT: CPT | Performed by: THORACIC SURGERY (CARDIOTHORACIC VASCULAR SURGERY)

## 2024-02-14 PROCEDURE — 1036F TOBACCO NON-USER: CPT | Performed by: THORACIC SURGERY (CARDIOTHORACIC VASCULAR SURGERY)

## 2024-02-14 PROCEDURE — 1159F MED LIST DOCD IN RCRD: CPT | Performed by: THORACIC SURGERY (CARDIOTHORACIC VASCULAR SURGERY)

## 2024-02-14 PROCEDURE — 99024 POSTOP FOLLOW-UP VISIT: CPT | Performed by: THORACIC SURGERY (CARDIOTHORACIC VASCULAR SURGERY)

## 2024-02-14 ASSESSMENT — LIFESTYLE VARIABLES
HOW OFTEN DO YOU HAVE A DRINK CONTAINING ALCOHOL: 2-4 TIMES A MONTH
SKIP TO QUESTIONS 9-10: 1
HOW MANY STANDARD DRINKS CONTAINING ALCOHOL DO YOU HAVE ON A TYPICAL DAY: 1 OR 2
HOW OFTEN DO YOU HAVE SIX OR MORE DRINKS ON ONE OCCASION: NEVER
AUDIT-C TOTAL SCORE: 2

## 2024-02-14 ASSESSMENT — ENCOUNTER SYMPTOMS
OCCASIONAL FEELINGS OF UNSTEADINESS: 1
DEPRESSION: 0
LOSS OF SENSATION IN FEET: 0

## 2024-02-14 ASSESSMENT — PAIN SCALES - GENERAL: PAINLEVEL: 4

## 2024-07-12 NOTE — PROGRESS NOTES
"Subjective   Heike Muñiz  is a 83 y.o. female who presents for evaluation of left lung cancer status post left upper lobe wedge with completion lobectomy on  1/9/24 (adenocarcinoma pT2aN0).       This started with a blood clot, which led to a scan and an incidental nodule. She then received a CT scan of the chest on 10/5/2023 which showed a 1.9 x 2.3 cm mass in the left upper lobe.  This included a solid component measuring 8 mm in diameter.  The solid component measuring 5 mm on the previous CT scan.  She was referred for a CT-guided lung biopsy on 10/31/2023, which showed lung cancer.  She also underwent a PET/CT on 11/13/2023, which showed no evidence of metastatic disease.  The patient received a thoracoscopic lingular segmental resection on 1/9/2024.  She was discharged from the hospital on 1/11/2024.    Currently the patient is {Usual state of health:81738}. She {T report deny:20033}. {WILDorBLANK:77268::\" \"}    {CWT [facility] stuff:09532}    She  reports that she quit smoking about 20 years ago. Her smoking use included cigarettes. She has never used smokeless tobacco. She reports current alcohol use. She reports that she does not use drugs.    Objective   Physical Exam  {T exam:07808::\" \"}  Diagnostic Studies  {CWT reviewed:84404}    Assessment/Plan   Overall, I believe that the patient {CWT doing well:00993}.     {CWT plan smart text:51039}    I recommend {CWTworkup:09248}    I discussed this in detail with the patient, including a discussion of alternatives. They were comfortable with this approach.     Enrique Sorenson MD  [phone]      "

## 2024-07-17 ENCOUNTER — APPOINTMENT (OUTPATIENT)
Dept: SURGERY | Facility: CLINIC | Age: 84
End: 2024-07-17
Payer: COMMERCIAL

## 2024-07-18 DIAGNOSIS — C34.12 PRIMARY MALIGNANT NEOPLASM OF LEFT UPPER LOBE OF LUNG (MULTI): Primary | ICD-10-CM

## 2024-07-24 ENCOUNTER — APPOINTMENT (OUTPATIENT)
Dept: RADIOLOGY | Facility: CLINIC | Age: 84
End: 2024-07-24
Payer: COMMERCIAL

## 2024-07-24 ENCOUNTER — APPOINTMENT (OUTPATIENT)
Dept: SURGERY | Facility: CLINIC | Age: 84
End: 2024-07-24
Payer: COMMERCIAL

## 2024-07-25 ENCOUNTER — APPOINTMENT (OUTPATIENT)
Dept: RADIOLOGY | Facility: CLINIC | Age: 84
End: 2024-07-25
Payer: COMMERCIAL

## 2024-07-31 ENCOUNTER — APPOINTMENT (OUTPATIENT)
Dept: SURGERY | Facility: CLINIC | Age: 84
End: 2024-07-31
Payer: COMMERCIAL

## 2024-08-15 NOTE — PROGRESS NOTES
"Subjective   Heike Muñiz  is a 83 y.o. female who presents for evaluation of left lung cancer status post left upper lobe wedge with completion lobectomy on  1/9/24 (adenocarcinoma pT2aN0).       This started with a blood clot, which led to a scan and an incidental nodule. She then received a CT scan of the chest on 10/5/2023 which showed a 1.9 x 2.3 cm mass in the left upper lobe.  This included a solid component measuring 8 mm in diameter.  The solid component measuring 5 mm on the previous CT scan.  She was referred for a CT-guided lung biopsy on 10/31/2023, which showed lung cancer.  She also underwent a PET/CT on 11/13/2023, which showed no evidence of metastatic disease.  The patient received a thoracoscopic lingular segmental resection on 1/9/2024.  She was discharged from the hospital on 1/11/2024.    Currently the patient is {Usual state of health:13820}. She {T report deny:91310}. {WILDorBLANK:25703::\" \"}    {CWT [facility] stuff:57155}    She  reports that she quit smoking about 20 years ago. Her smoking use included cigarettes. She has never used smokeless tobacco. She reports current alcohol use. She reports that she does not use drugs.    Objective   Physical Exam  {T exam:48857::\" \"}  Diagnostic Studies  {CWT reviewed:50054}    Assessment/Plan   Overall, I believe that the patient {CWT doing well:74139}.     {CWT plan smart text:75831}    I recommend {CWTworkup:80689}    I discussed this in detail with the patient, including a discussion of alternatives. They were comfortable with this approach.     Enrique Sorenson MD  [phone]      "

## 2024-08-21 ENCOUNTER — APPOINTMENT (OUTPATIENT)
Dept: SURGERY | Facility: CLINIC | Age: 84
End: 2024-08-21
Payer: COMMERCIAL

## 2024-08-21 ENCOUNTER — APPOINTMENT (OUTPATIENT)
Dept: RADIOLOGY | Facility: CLINIC | Age: 84
End: 2024-08-21
Payer: COMMERCIAL

## 2024-08-28 ENCOUNTER — APPOINTMENT (OUTPATIENT)
Dept: SURGERY | Facility: CLINIC | Age: 84
End: 2024-08-28
Payer: COMMERCIAL

## 2024-09-16 NOTE — PROGRESS NOTES
"Subjective   Heike Muñiz  is a 83 y.o. female who presents for evaluation of left lung cancer status post left upper lobe wedge with completion lobectomy on 1/9/24 (adenocarcinoma pT2aN0).       This started with a blood clot, which led to a scan and an incidental nodule. She then received a CT scan of the chest on 10/5/2023 which showed a 1.9 x 2.3 cm mass in the left upper lobe.  This included a solid component measuring 8 mm in diameter.  The solid component measuring 5 mm on the previous CT scan.  She was referred for a CT-guided lung biopsy on 10/31/2023, which showed lung cancer.  She also underwent a PET/CT on 11/13/2023, which showed no evidence of metastatic disease.  The patient received a thoracoscopic lingular segmental resection on 1/9/2024.  She was discharged from the hospital on 1/11/2024.    Currently the patient is presenting for routine follow-up and in their usual state of health. \"I'm fine!\". She denies the following symptoms: chest pain, shortness of breath at rest, shortness of breath with activity, cough, hemoptysis, fevers, chills, and weight loss.      There have been no significant changes to their documented medical, surgical and family history.     She  reports that she quit smoking about 20 years ago. Her smoking use included cigarettes. She has never used smokeless tobacco. She reports current alcohol use. She reports that she does not use drugs.    Objective   Physical Exam  Phone visit  Diagnostic Studies  I reviewed a CT chest performed recently. I do not see any new or concerning nodules or adenopathy    Assessment/Plan   Overall, I believe that the patient has no evidence of cancer recurrence.     Based on the patient's clinical presentation and my review of their radiographic imaging, I believe they have no evidence of cancer recurrence.  I recommend ongoing radiographic screening.    I recommend CT chest in 6 months    I discussed this in detail with the patient, including a " discussion of alternatives. They were comfortable with this approach.     Enrique Sorenson MD  [phone]  Time 5 min

## 2024-09-18 ENCOUNTER — TELEMEDICINE (OUTPATIENT)
Dept: SURGERY | Facility: CLINIC | Age: 84
End: 2024-09-18
Payer: COMMERCIAL

## 2024-09-18 ENCOUNTER — HOSPITAL ENCOUNTER (OUTPATIENT)
Dept: RADIOLOGY | Facility: CLINIC | Age: 84
Discharge: HOME | End: 2024-09-18
Payer: COMMERCIAL

## 2024-09-18 DIAGNOSIS — C34.12 MALIGNANT NEOPLASM OF UPPER LOBE OF LEFT LUNG (MULTI): ICD-10-CM

## 2024-09-18 DIAGNOSIS — C34.12 PRIMARY CANCER OF LEFT UPPER LOBE OF LUNG (MULTI): Primary | ICD-10-CM

## 2024-09-18 DIAGNOSIS — C34.12 PRIMARY MALIGNANT NEOPLASM OF LEFT UPPER LOBE OF LUNG (MULTI): ICD-10-CM

## 2024-09-18 PROCEDURE — 71250 CT THORAX DX C-: CPT

## 2024-09-18 PROCEDURE — 1126F AMNT PAIN NOTED NONE PRSNT: CPT | Performed by: THORACIC SURGERY (CARDIOTHORACIC VASCULAR SURGERY)

## 2024-09-18 PROCEDURE — 99441 PR PHYS/QHP TELEPHONE EVALUATION 5-10 MIN: CPT | Performed by: THORACIC SURGERY (CARDIOTHORACIC VASCULAR SURGERY)

## 2024-09-18 PROCEDURE — 1159F MED LIST DOCD IN RCRD: CPT | Performed by: THORACIC SURGERY (CARDIOTHORACIC VASCULAR SURGERY)

## 2024-09-18 PROCEDURE — 71250 CT THORAX DX C-: CPT | Performed by: RADIOLOGY

## 2024-09-18 ASSESSMENT — ENCOUNTER SYMPTOMS
DEPRESSION: 0
LOSS OF SENSATION IN FEET: 0
OCCASIONAL FEELINGS OF UNSTEADINESS: 0

## 2024-09-18 ASSESSMENT — PAIN SCALES - GENERAL: PAINLEVEL: 0-NO PAIN

## 2024-09-25 ENCOUNTER — APPOINTMENT (OUTPATIENT)
Dept: SURGERY | Facility: CLINIC | Age: 84
End: 2024-09-25
Payer: COMMERCIAL

## 2024-11-05 ENCOUNTER — TELEPHONE (OUTPATIENT)
Dept: DERMATOLOGY | Facility: CLINIC | Age: 84
End: 2024-11-05
Payer: COMMERCIAL

## 2024-11-05 DIAGNOSIS — L24.9 IRRITANT CONTACT DERMATITIS, UNSPECIFIED TRIGGER: Primary | ICD-10-CM

## 2024-11-05 RX ORDER — TRIAMCINOLONE ACETONIDE 1 MG/G
CREAM TOPICAL 2 TIMES DAILY PRN
Qty: 28.4 G | Refills: 0 | Status: SHIPPED | OUTPATIENT
Start: 2024-11-05

## 2024-11-05 NOTE — TELEPHONE ENCOUNTER
Patient called left message stating she would like a refill of her prescription cream patient was last seen November 16, 2023 for Irritant contact dermatitis, unspecified trigger  Left Breast.  Please advise.

## 2025-03-10 NOTE — PROGRESS NOTES
Subjective   Heike Muñiz  is a 84 y.o. female who presents for evaluation of left lung cancer status post left upper lobe wedge with completion lobectomy on 1/9/24 (adenocarcinoma pT2aN0).       This started with a blood clot, which led to a scan and an incidental nodule. She then received a CT scan of the chest on 10/5/2023 which showed a 1.9 x 2.3 cm mass in the left upper lobe.  This included a solid component measuring 8 mm in diameter.  The solid component measuring 5 mm on the previous CT scan.  She was referred for a CT-guided lung biopsy on 10/31/2023, which showed lung cancer.  She also underwent a PET/CT on 11/13/2023, which showed no evidence of metastatic disease.  The patient received a thoracoscopic lingular segmental resection on 1/9/2024.  She was discharged from the hospital on 1/11/2024.    Currently the patient is presenting for routine follow-up and in their usual state of health. She denies the following symptoms: shortness of breath at rest, shortness of breath with activity, cough, hemoptysis, fevers, chills, and weight loss.      There have been no significant changes to their documented medical, surgical and family history.     She  reports that she quit smoking about 21 years ago. Her smoking use included cigarettes. She has never used smokeless tobacco. She reports current alcohol use. She reports that she does not use drugs.    Objective   Physical Exam  The patient is well-appearing and in no acute distress. The trachea is midline and there is no crepitus. The lungs were clear to auscultation grossly. There was good effort and excursion. The heart had a regular rate and rhythm. The abdomen was soft, nontender and nondistended. The extremities had no edema or gross deformities. Mood and affect are appropriate.  Diagnostic Studies  I reviewed a CT chest performed recently. I do not see any new or concerning nodules or adenopathy  In the area adjacent to the staple line is unchanged in size  and perhaps slightly smaller  Assessment/Plan   I believe that the patient is doing well.     Based on the patient's clinical presentation and my review of their radiographic imaging, I believe they have no evidence of cancer recurrence.  I recommend ongoing radiographic screening.    I recommend CT chest in 6 months    I discussed this in detail with the patient, including a discussion of alternatives. They were comfortable with this approach.     Enrique Sorenson MD  [phone]

## 2025-03-12 ENCOUNTER — HOSPITAL ENCOUNTER (OUTPATIENT)
Dept: RADIOLOGY | Facility: CLINIC | Age: 85
Discharge: HOME | End: 2025-03-12
Payer: COMMERCIAL

## 2025-03-12 ENCOUNTER — OFFICE VISIT (OUTPATIENT)
Dept: SURGERY | Facility: CLINIC | Age: 85
End: 2025-03-12
Payer: COMMERCIAL

## 2025-03-12 VITALS
HEIGHT: 63 IN | BODY MASS INDEX: 31.61 KG/M2 | OXYGEN SATURATION: 97 % | HEART RATE: 61 BPM | WEIGHT: 178.4 LBS | TEMPERATURE: 98.1 F | DIASTOLIC BLOOD PRESSURE: 58 MMHG | SYSTOLIC BLOOD PRESSURE: 129 MMHG

## 2025-03-12 DIAGNOSIS — C34.12 MALIGNANT NEOPLASM OF UPPER LOBE OF LEFT LUNG (MULTI): ICD-10-CM

## 2025-03-12 DIAGNOSIS — C34.12 PRIMARY CANCER OF LEFT UPPER LOBE OF LUNG (MULTI): Primary | ICD-10-CM

## 2025-03-12 PROCEDURE — 1036F TOBACCO NON-USER: CPT | Performed by: THORACIC SURGERY (CARDIOTHORACIC VASCULAR SURGERY)

## 2025-03-12 PROCEDURE — 71250 CT THORAX DX C-: CPT

## 2025-03-12 PROCEDURE — 1159F MED LIST DOCD IN RCRD: CPT | Performed by: THORACIC SURGERY (CARDIOTHORACIC VASCULAR SURGERY)

## 2025-03-12 PROCEDURE — 1126F AMNT PAIN NOTED NONE PRSNT: CPT | Performed by: THORACIC SURGERY (CARDIOTHORACIC VASCULAR SURGERY)

## 2025-03-12 PROCEDURE — 71250 CT THORAX DX C-: CPT | Performed by: RADIOLOGY

## 2025-03-12 PROCEDURE — 99214 OFFICE O/P EST MOD 30 MIN: CPT | Mod: 25 | Performed by: THORACIC SURGERY (CARDIOTHORACIC VASCULAR SURGERY)

## 2025-03-12 PROCEDURE — 99214 OFFICE O/P EST MOD 30 MIN: CPT | Performed by: THORACIC SURGERY (CARDIOTHORACIC VASCULAR SURGERY)

## 2025-03-12 RX ORDER — LORATADINE 10 MG/1
10 TABLET ORAL DAILY PRN
COMMUNITY

## 2025-03-12 SDOH — ECONOMIC STABILITY: FOOD INSECURITY: WITHIN THE PAST 12 MONTHS, YOU WORRIED THAT YOUR FOOD WOULD RUN OUT BEFORE YOU GOT MONEY TO BUY MORE.: NEVER TRUE

## 2025-03-12 SDOH — ECONOMIC STABILITY: FOOD INSECURITY: WITHIN THE PAST 12 MONTHS, THE FOOD YOU BOUGHT JUST DIDN'T LAST AND YOU DIDN'T HAVE MONEY TO GET MORE.: NEVER TRUE

## 2025-03-12 ASSESSMENT — ENCOUNTER SYMPTOMS
DEPRESSION: 0
OCCASIONAL FEELINGS OF UNSTEADINESS: 1
LOSS OF SENSATION IN FEET: 0

## 2025-03-12 ASSESSMENT — PATIENT HEALTH QUESTIONNAIRE - PHQ9
1. LITTLE INTEREST OR PLEASURE IN DOING THINGS: NOT AT ALL
2. FEELING DOWN, DEPRESSED OR HOPELESS: NOT AT ALL
SUM OF ALL RESPONSES TO PHQ9 QUESTIONS 1 AND 2: 0

## 2025-03-12 ASSESSMENT — LIFESTYLE VARIABLES
AUDIT-C TOTAL SCORE: 2
SKIP TO QUESTIONS 9-10: 1
HOW OFTEN DO YOU HAVE SIX OR MORE DRINKS ON ONE OCCASION: NEVER
HOW MANY STANDARD DRINKS CONTAINING ALCOHOL DO YOU HAVE ON A TYPICAL DAY: 1 OR 2
HOW OFTEN DO YOU HAVE A DRINK CONTAINING ALCOHOL: 2-4 TIMES A MONTH

## 2025-03-12 ASSESSMENT — COLUMBIA-SUICIDE SEVERITY RATING SCALE - C-SSRS
6. HAVE YOU EVER DONE ANYTHING, STARTED TO DO ANYTHING, OR PREPARED TO DO ANYTHING TO END YOUR LIFE?: NO
2. HAVE YOU ACTUALLY HAD ANY THOUGHTS OF KILLING YOURSELF?: NO
1. IN THE PAST MONTH, HAVE YOU WISHED YOU WERE DEAD OR WISHED YOU COULD GO TO SLEEP AND NOT WAKE UP?: NO

## 2025-03-12 ASSESSMENT — PAIN SCALES - GENERAL: PAINLEVEL_OUTOF10: 0-NO PAIN

## 2025-03-19 ENCOUNTER — APPOINTMENT (OUTPATIENT)
Dept: SURGERY | Facility: CLINIC | Age: 85
End: 2025-03-19
Payer: COMMERCIAL

## 2025-03-19 ENCOUNTER — APPOINTMENT (OUTPATIENT)
Dept: RADIOLOGY | Facility: CLINIC | Age: 85
End: 2025-03-19
Payer: COMMERCIAL

## 2025-05-05 DIAGNOSIS — L24.9 IRRITANT CONTACT DERMATITIS, UNSPECIFIED TRIGGER: ICD-10-CM

## 2025-05-05 RX ORDER — TRIAMCINOLONE ACETONIDE 1 MG/G
CREAM TOPICAL
Qty: 30 G | Refills: 0 | OUTPATIENT
Start: 2025-05-05

## 2025-05-12 ENCOUNTER — APPOINTMENT (OUTPATIENT)
Dept: DERMATOLOGY | Facility: CLINIC | Age: 85
End: 2025-05-12
Payer: COMMERCIAL

## 2025-05-12 DIAGNOSIS — L82.1 SEBORRHEIC KERATOSIS: ICD-10-CM

## 2025-05-12 DIAGNOSIS — L24.9 IRRITANT CONTACT DERMATITIS, UNSPECIFIED TRIGGER: ICD-10-CM

## 2025-05-12 DIAGNOSIS — Z12.83 SCREENING EXAM FOR SKIN CANCER: ICD-10-CM

## 2025-05-12 DIAGNOSIS — D18.01 CHERRY ANGIOMA: ICD-10-CM

## 2025-05-12 DIAGNOSIS — L30.4 INTERTRIGO: ICD-10-CM

## 2025-05-12 DIAGNOSIS — D22.9 NEVUS: Primary | ICD-10-CM

## 2025-05-12 DIAGNOSIS — L81.4 LENTIGO: ICD-10-CM

## 2025-05-12 PROCEDURE — 1159F MED LIST DOCD IN RCRD: CPT | Performed by: NURSE PRACTITIONER

## 2025-05-12 PROCEDURE — 1036F TOBACCO NON-USER: CPT | Performed by: NURSE PRACTITIONER

## 2025-05-12 PROCEDURE — 99214 OFFICE O/P EST MOD 30 MIN: CPT | Performed by: NURSE PRACTITIONER

## 2025-05-12 RX ORDER — KETOCONAZOLE 20 MG/G
CREAM TOPICAL 2 TIMES DAILY
Qty: 60 G | Refills: 3 | Status: SHIPPED | OUTPATIENT
Start: 2025-05-12

## 2025-05-12 RX ORDER — TRIAMCINOLONE ACETONIDE 1 MG/G
CREAM TOPICAL 2 TIMES DAILY PRN
Qty: 28.4 G | Refills: 0 | Status: SHIPPED | OUTPATIENT
Start: 2025-05-12

## 2025-05-12 NOTE — Clinical Note
Patient recently had a biopsy 3 days ago for diagnosis of lung cancer with a contact allergen related to adhesive from the bandage at the biopsy site.  Patient states it is itchy and was previously blistering.  There is no bandage on exam today patient encouraged to discontinue use.  Biopsy site appears to be healing well without evidence of infection

## 2025-05-12 NOTE — PROGRESS NOTES
Subjective     Heike Muñiz is a 84 y.o. female who presents for the following: Skin Check, Dermatitis, and intertrigo.   Established patient in for yearly full body skin exam.   Patient would additionally like to have refills of ketoconazole last prescribed to intertrigo to inframammary folds.   Dermatitis to chest last prescribed to use triamcinolone 0.1% cream.     Review of Systems:  No other skin or systemic complaints other than what is documented elsewhere in the note.    The following portions of the chart were reviewed this encounter and updated as appropriate:       Skin Cancer History  Biopsy Log Book  No skin cancers from Specimen Tracking.    Additional History      Specialty Problems    None    Past Medical History:  Heike Muñiz  has a past medical history of Abdominal pain (01/22/2024), Abnormal chest CT (01/22/2024), Acquired spondylolisthesis (01/22/2024), Acute sinusitis (01/22/2024), Acute urinary retention (01/22/2024), Age-related osteoporosis without current pathological fracture (11/16/2022), Anemia (01/22/2024), Anemia, unspecified (11/16/2022), Arthralgia of hip (01/22/2024), Arthritis, Benign neoplasm of soft tissue (01/22/2024), Bony sclerosis ([facility]) (01/22/2024), Brain tumor (Multi), Broken bones, Cancer of left lung (Multi) (11/22/2023), Cardiac murmur, unspecified (11/16/2022), Cataract, Chest pain, unspecified (11/16/2022), Chronic constipation (01/22/2024), Common bile duct obstruction (11/16/2022), Difficulty in walking, not elsewhere classified (11/16/2022), Disability of walking (11/16/2022), Diverticulitis (01/22/2024), Diverticulosis, DVT (deep venous thrombosis) (Multi), Esophageal disorder (11/16/2022), Fall (08/09/2022), Gait instability (04/14/2023), Gastro-esophageal reflux disease without esophagitis (11/16/2022), Gastroesophageal reflux disease (01/22/2024), GERD (gastroesophageal reflux disease), Greater trochanteric bursitis (01/22/2024), Heart disease, Heart murmur  (11/16/2022), Heartburn, Hemangioma of skin and subcutaneous tissue (11/10/2022), Hematoma (08/09/2022), History of brain disorder (01/22/2024), History of endocrine disorder (01/22/2024), History of meningitis (01/22/2024), HL (hearing loss), Hypercholesteremia, Hypoglycemia (01/22/2024), Inflammation of sacroiliac joint (CMS-[facility]) (01/22/2024), Intertrigo (01/22/2024), Irritant contact dermatitis (01/22/2024), Lentiginosis (01/22/2024), Low back pain (01/22/2024), Lumbar radiculopathy (11/16/2022), Lumbar radiculopathy (11/16/2022), Lumbar spinal stenosis (11/16/2022), Lumbosacral spondylosis (04/14/2023), Lung nodule (01/22/2024), Melanocytic nevi of other parts of face (11/10/2022), Melanocytic nevi of trunk (11/10/2022), Meningitis ([facility]), Muscle weakness (generalized) (11/16/2022), Nail disorder, unspecified (11/10/2022), Nausea and/or vomiting (01/22/2024), Neoplasm of uncertain behavior of skin (11/10/2022), Nocturia (01/22/2024), Obesity, unspecified (11/16/2022), Obstruction of bile duct (CMS-HCC) (11/16/2022), Osteopetrosis ([facility]) (01/22/2024), Osteoporosis (01/22/2024), Osteoporosis (01/22/2024), Other displaced fracture of upper end of left humerus, subsequent encounter for fracture with routine healing (11/16/2022), Other melanin hyperpigmentation (11/10/2022), Other seborrheic keratosis (11/10/2022), Other spondylosis with radiculopathy, lumbosacral region (11/16/2022), Peptic ulcer disease (01/22/2024), Personal history of other diseases of the musculoskeletal system and connective tissue (09/03/2015), Personal history of other endocrine, nutritional and metabolic disease (09/03/2015), Personal history of other venous thrombosis and embolism (11/16/2022), Polyp of colon (11/16/2022), Portal vein thrombosis, 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    Past Surgical History:  Heike Muñiz  has a past surgical history that includes Colonoscopy (09/03/2015); Tonsillectomy (09/03/2015); Other surgical history (10/05/2015); Cholecystectomy; Adenoidectomy; Upper gastrointestinal endoscopy; Brain surgery; Carpal tunnel release; Cardiac catheterization; Colon surgery; and Lung lobectomy (Left, 01/09/2024).    Family History:  Patient family history includes Anemia in her mother; Cancer in her sister; Heart attack in her mother; Hyperlipidemia in her sister; htn in her sister.    Social History:  Heike Muñiz  reports that she quit smoking about 21 years ago. Her smoking use included cigarettes. She has never used smokeless tobacco. She reports current alcohol use. She reports that she does not use drugs.    Allergies:  Moxifloxacin, Azithromycin, Acetaminophen, Adhes. band-tape-benzalkonium, Amoxicillin, Antacid anti-gas (magald-sim), Antacid ii, Aspirin, Codeine-cpm-pe-acetaminophen, Egg yolk, Hydrocodone, Hydrocodone-acetaminophen, Metronidazole, and Miconazole    Current Medications / CAM's:  Current Medications[1]     Objective   Well appearing patient in no apparent distress; mood and affect are within normal limits.      Assessment/Plan   Skin Exam  1. NEVUS  Generalized  Uniform pigmented macule(s)/papule(s) with reassuring findings on dermoscopy  -Discussed nature of condition  -Reassurance, benign-appearing features on examination today  -Recommend continued observation  2. LENTIGO  Generalized  Tan macules  -Benign appearing on exam  -Reassurance, recommend observation  3. SEBORRHEIC  KERATOSIS  Generalized  Stuck on, waxy macule(s)/papule(s)/plaque(s) with comedo-like openings and milia like cysts  -Discussed nature of condition  -Reassurance, recommend continued observation  4. SCREENING EXAM FOR SKIN CANCER  Generalized  5. INTERTRIGO  Left Inframammary Fold, Right Inframammary Fold      PLAN:  Start ketoconazole 2% cream twice daily until clear  6. IRRITANT CONTACT DERMATITIS, UNSPECIFIED TRIGGER  Left Breast  Patient recently had a biopsy 3 days ago for diagnosis of lung cancer with a contact allergen related to adhesive from the bandage at the biopsy site.  Patient states it is itchy and was previously blistering.  There is no bandage on exam today patient encouraged to discontinue use.  Biopsy site appears to be healing well without evidence of infection  Can use triamcinolone 0.1% cream twice daily for up to 2 weeks or until itching and redness resolved  Related Medications  triamcinolone (Kenalog) 0.1 % cream  Apply topically 2 times a day as needed for rash. For up to 14 days  7. MUNOZ ANGIOMA  Generalized  Cherry red papules  -Discussed nature of condition  -Reassurance, recommend continued observation            [1]   Current Outpatient Medications:     calcium carbonate/vitamin D3 (CALCIUM 600 + D,3, ORAL), Take 2 tablets by mouth once daily., Disp: , Rfl:     cholecalciferol (Vitamin D3) 5,000 Units tablet, Take 1 tablet (125 mcg) by mouth once daily., Disp: , Rfl:     ciprofloxacin (Ciloxan) 0.3 % ophthalmic solution, Administer 1 drop into the right eye 2 times a day. Apply to affected left finger nail (Patient not taking: Reported on 3/12/2025), Disp: 5 mL, Rfl: 1    cyanocobalamin (Vitamin B-12) 1,000 mcg tablet, Take 1 tablet (1,000 mcg) by mouth once daily., Disp: , Rfl:     Eliquis 2.5 mg tablet, Take 1 tablet (2.5 mg) by mouth 2 times a day., Disp: , Rfl:     fluticasone (Flonase Allergy Relief) 50 mcg/actuation nasal spray, Administer 1 spray into each nostril once daily  as needed., Disp: , Rfl:     gabapentin (Neurontin) 300 mg capsule, Take 1 capsule (300 mg) by mouth 2 times a day., Disp: , Rfl:     loratadine (Claritin) 10 mg tablet, Take 1 tablet (10 mg) by mouth once daily as needed for allergies., Disp: , Rfl:     melatonin 3 mg tablet, Take 2 tablets (6 mg) by mouth once daily at bedtime., Disp: , Rfl:     metoprolol tartrate (Lopressor) 25 mg tablet, Take 0.5 tablets (12.5 mg) by mouth 2 times a day., Disp: 15 tablet, Rfl: 0    mv-min-folic acid-lutein (Essential Woman 50 Plus) 0.4-250 mg-mcg tablet, Take 1 tablet by mouth once daily., Disp: , Rfl:     pantoprazole (ProtoNix) 40 mg EC tablet, Take 1 tablet (40 mg) by mouth 2 times a day., Disp: , Rfl:     simvastatin (Zocor) 40 mg tablet, Take 1 tablet (40 mg) by mouth once daily at bedtime., Disp: , Rfl:     triamcinolone (Kenalog) 0.1 % cream, Apply topically 2 times a day as needed for rash. For up to 14 days, Disp: 28.4 g, Rfl: 0    TURMERIC ORAL, Take 1 tablet by mouth once daily., Disp: , Rfl:     vit C/E/Zn/coppr/lutein/zeaxan (PRESERVISION AREDS-2 ORAL), Take 1 tablet by mouth once daily., Disp: , Rfl:

## 2025-05-12 NOTE — Clinical Note
Can use triamcinolone 0.1% cream twice daily for up to 2 weeks or until itching and redness resolved

## 2025-07-31 LAB — NON-UH HIE AP TISSUE SEND OUT: NORMAL

## (undated) DEVICE — SYRINGE, 60 CC, IRRIGATION, BULB, CONTRO-BULB, PAPER POUCH

## (undated) DEVICE — BAG, TISSUE RETRIEVAL, 10MM, ANCHOR

## (undated) DEVICE — MARKER, SKIN, REGULAR TIP, W/FLEXI-RULER

## (undated) DEVICE — CARE KIT, LAPAROSCOPIC, ADVANCED

## (undated) DEVICE — GAUZE, KITTNER ROLL, STERILE

## (undated) DEVICE — CONNECTOR, 5 IN 1, STERILE

## (undated) DEVICE — CATHETER, THORACIC, STRAIGHT, ADULT, 28 FR, PVC

## (undated) DEVICE — PROTECTOR, HEEL/ANKLE/ELBOW, UNIVERSAL

## (undated) DEVICE — DRESSING, DRAIN SPONGE, EXCILON AMD, 4X4 IN, 6 PLY, ST

## (undated) DEVICE — OBTURATOR, BLADELESS , SU

## (undated) DEVICE — TUBING SET, TRI-LUMEN, FILTERED, F/AIRSEAL

## (undated) DEVICE — DRAPE, COLUMN, DAVINCI XI

## (undated) DEVICE — FORCEPS, CADIERE, DAVINCI XI

## (undated) DEVICE — TIP, ELECTROSURGICAL, 2.5 BLADE, MODIFIED

## (undated) DEVICE — SLEEVE, VASO PRESS, CALF GARMENT, MEDIUM, GREEN

## (undated) DEVICE — TUBE SET, PNEUMOLAR HEATED, SMOKE EVACU, HIGH-FLOW

## (undated) DEVICE — CATHETER KIT, RADIAL ARTLINE, 20G X 1 3/4

## (undated) DEVICE — TIP, ELECTROSURGICAL, 6.5 BLADE, MODIFIED EXTENDED

## (undated) DEVICE — SYRINGE, 20 CC, LUER SLIP

## (undated) DEVICE — TRAP, SPECIMEN, 40 ML

## (undated) DEVICE — ACCESS PORT, 12MM, 120MM LENGTH, LOW PROFILE W/BLADELESS OPTICAL TIP

## (undated) DEVICE — GRASPER, FENESTRATED TIP-UP XI

## (undated) DEVICE — CANNULA REDUCER, DAVINCI XI

## (undated) DEVICE — COLLECTION UNIT, DRAINAGE, THORACIC, SINGLE TUBE, DRY SUCTION, ATS COMPATIBLE, OASIS 3600, LF

## (undated) DEVICE — RELOAD, SUREFORM 45, 4.3 GREEN

## (undated) DEVICE — STAPLER, SUREFORM 45, DAVINCI XI

## (undated) DEVICE — DRAPE, ARM XI

## (undated) DEVICE — RELOAD, SUREFORM 45, 2.5 WHITE

## (undated) DEVICE — SHEET, SPLIT, CARDIOVASCULAR TIBURON

## (undated) DEVICE — Device

## (undated) DEVICE — VESSEL LOOP, RED MAXI, 2 CARD

## (undated) DEVICE — SEAL, UNIVERSAL 5-8MM  XI

## (undated) DEVICE — RELOAD, SUREFORM 45, 4.6 BLACK

## (undated) DEVICE — NEEDLE, HYPODERMIC, 25 G X 2 IN

## (undated) DEVICE — DRAPE, TIBURON, SPLIT SHEET, REINF ADH STRIP, 77X122

## (undated) DEVICE — DRAPE, SHEET, THREE QUARTER, FAN FOLD, 57 X 77 IN

## (undated) DEVICE — CANNULA SEAL, STAPLER, DAVINCI XI

## (undated) DEVICE — APPLICATOR, CHLORAPREP, W/ORANGE TINT, 26ML

## (undated) DEVICE — FILTER, LAPAROSCOPIC, PLUME PORT, W/LUER CONNECTOR, STERILE

## (undated) DEVICE — GRASPER, LONG, BIPOLAR, DAVINCI XI